# Patient Record
Sex: MALE | Race: WHITE | NOT HISPANIC OR LATINO | Employment: FULL TIME | URBAN - METROPOLITAN AREA
[De-identification: names, ages, dates, MRNs, and addresses within clinical notes are randomized per-mention and may not be internally consistent; named-entity substitution may affect disease eponyms.]

---

## 2020-09-03 ENCOUNTER — COMMUNICATION - HEALTHEAST (OUTPATIENT)
Dept: FAMILY MEDICINE | Facility: CLINIC | Age: 30
End: 2020-09-03

## 2020-09-04 ENCOUNTER — OFFICE VISIT - HEALTHEAST (OUTPATIENT)
Dept: FAMILY MEDICINE | Facility: CLINIC | Age: 30
End: 2020-09-04

## 2020-09-04 DIAGNOSIS — F32.0 MILD MAJOR DEPRESSION (H): ICD-10-CM

## 2020-09-04 ASSESSMENT — ANXIETY QUESTIONNAIRES
IF YOU CHECKED OFF ANY PROBLEMS ON THIS QUESTIONNAIRE, HOW DIFFICULT HAVE THESE PROBLEMS MADE IT FOR YOU TO DO YOUR WORK, TAKE CARE OF THINGS AT HOME, OR GET ALONG WITH OTHER PEOPLE: SOMEWHAT DIFFICULT
3. WORRYING TOO MUCH ABOUT DIFFERENT THINGS: SEVERAL DAYS
4. TROUBLE RELAXING: MORE THAN HALF THE DAYS
7. FEELING AFRAID AS IF SOMETHING AWFUL MIGHT HAPPEN: SEVERAL DAYS
6. BECOMING EASILY ANNOYED OR IRRITABLE: SEVERAL DAYS
2. NOT BEING ABLE TO STOP OR CONTROL WORRYING: SEVERAL DAYS
1. FEELING NERVOUS, ANXIOUS, OR ON EDGE: NOT AT ALL
5. BEING SO RESTLESS THAT IT IS HARD TO SIT STILL: NOT AT ALL
GAD7 TOTAL SCORE: 6

## 2020-09-04 ASSESSMENT — PATIENT HEALTH QUESTIONNAIRE - PHQ9: SUM OF ALL RESPONSES TO PHQ QUESTIONS 1-9: 12

## 2020-09-26 ENCOUNTER — COMMUNICATION - HEALTHEAST (OUTPATIENT)
Dept: FAMILY MEDICINE | Facility: CLINIC | Age: 30
End: 2020-09-26

## 2020-09-26 DIAGNOSIS — F32.0 MILD MAJOR DEPRESSION (H): ICD-10-CM

## 2021-03-30 ENCOUNTER — COMMUNICATION - HEALTHEAST (OUTPATIENT)
Dept: FAMILY MEDICINE | Facility: CLINIC | Age: 31
End: 2021-03-30

## 2021-03-30 DIAGNOSIS — F32.0 MILD MAJOR DEPRESSION (H): ICD-10-CM

## 2021-05-06 ENCOUNTER — APPOINTMENT (OUTPATIENT)
Dept: CT IMAGING | Facility: CLINIC | Age: 31
End: 2021-05-06
Attending: EMERGENCY MEDICINE
Payer: COMMERCIAL

## 2021-05-06 ENCOUNTER — NURSE TRIAGE (OUTPATIENT)
Dept: NURSING | Facility: CLINIC | Age: 31
End: 2021-05-06

## 2021-05-06 ENCOUNTER — COMMUNICATION - HEALTHEAST (OUTPATIENT)
Dept: SCHEDULING | Facility: CLINIC | Age: 31
End: 2021-05-06

## 2021-05-06 ENCOUNTER — RECORDS - HEALTHEAST (OUTPATIENT)
Dept: ADMINISTRATIVE | Facility: OTHER | Age: 31
End: 2021-05-06

## 2021-05-06 ENCOUNTER — HOSPITAL ENCOUNTER (OUTPATIENT)
Facility: CLINIC | Age: 31
Setting detail: OBSERVATION
Discharge: HOME | End: 2021-05-07
Attending: EMERGENCY MEDICINE | Admitting: SURGERY
Payer: COMMERCIAL

## 2021-05-06 DIAGNOSIS — K35.30 ACUTE APPENDICITIS WITH LOCALIZED PERITONITIS, WITHOUT PERFORATION, ABSCESS, OR GANGRENE: ICD-10-CM

## 2021-05-06 LAB
ALBUMIN SERPL-MCNC: 3.9 G/DL (ref 3.4–5)
ALBUMIN UR-MCNC: NEGATIVE MG/DL
ALP SERPL-CCNC: 83 U/L (ref 40–150)
ALT SERPL W P-5'-P-CCNC: 39 U/L (ref 0–70)
ANION GAP SERPL CALCULATED.3IONS-SCNC: 5 MMOL/L (ref 3–14)
APPEARANCE UR: CLEAR
AST SERPL W P-5'-P-CCNC: 20 U/L (ref 0–45)
BASOPHILS # BLD AUTO: 0.1 10E9/L (ref 0–0.2)
BASOPHILS NFR BLD AUTO: 0.4 %
BILIRUB SERPL-MCNC: 0.6 MG/DL (ref 0.2–1.3)
BILIRUB UR QL STRIP: NEGATIVE
BUN SERPL-MCNC: 14 MG/DL (ref 7–30)
CALCIUM SERPL-MCNC: 9.4 MG/DL (ref 8.5–10.1)
CHLORIDE SERPL-SCNC: 105 MMOL/L (ref 94–109)
CO2 SERPL-SCNC: 27 MMOL/L (ref 20–32)
COLOR UR AUTO: ABNORMAL
CREAT SERPL-MCNC: 0.84 MG/DL (ref 0.66–1.25)
DIFFERENTIAL METHOD BLD: ABNORMAL
EOSINOPHIL # BLD AUTO: 0 10E9/L (ref 0–0.7)
EOSINOPHIL NFR BLD AUTO: 0 %
ERYTHROCYTE [DISTWIDTH] IN BLOOD BY AUTOMATED COUNT: 12.1 % (ref 10–15)
GFR SERPL CREATININE-BSD FRML MDRD: >90 ML/MIN/{1.73_M2}
GLUCOSE SERPL-MCNC: 100 MG/DL (ref 70–99)
GLUCOSE UR STRIP-MCNC: NEGATIVE MG/DL
HCT VFR BLD AUTO: 48.2 % (ref 40–53)
HGB BLD-MCNC: 16.8 G/DL (ref 13.3–17.7)
HGB UR QL STRIP: NEGATIVE
IMM GRANULOCYTES # BLD: 0.2 10E9/L (ref 0–0.4)
IMM GRANULOCYTES NFR BLD: 0.7 %
KETONES UR STRIP-MCNC: 10 MG/DL
LABORATORY COMMENT REPORT: NORMAL
LEUKOCYTE ESTERASE UR QL STRIP: NEGATIVE
LIPASE SERPL-CCNC: 63 U/L (ref 73–393)
LYMPHOCYTES # BLD AUTO: 1.6 10E9/L (ref 0.8–5.3)
LYMPHOCYTES NFR BLD AUTO: 7.7 %
MCH RBC QN AUTO: 31.1 PG (ref 26.5–33)
MCHC RBC AUTO-ENTMCNC: 34.9 G/DL (ref 31.5–36.5)
MCV RBC AUTO: 89 FL (ref 78–100)
MONOCYTES # BLD AUTO: 1.1 10E9/L (ref 0–1.3)
MONOCYTES NFR BLD AUTO: 5.2 %
NEUTROPHILS # BLD AUTO: 17.7 10E9/L (ref 1.6–8.3)
NEUTROPHILS NFR BLD AUTO: 86 %
NITRATE UR QL: NEGATIVE
NRBC # BLD AUTO: 0 10*3/UL
NRBC BLD AUTO-RTO: 0 /100
PH UR STRIP: 5.5 PH (ref 5–7)
PLATELET # BLD AUTO: 338 10E9/L (ref 150–450)
POTASSIUM SERPL-SCNC: 3.6 MMOL/L (ref 3.4–5.3)
PROT SERPL-MCNC: 7.7 G/DL (ref 6.8–8.8)
RBC # BLD AUTO: 5.4 10E12/L (ref 4.4–5.9)
RBC #/AREA URNS AUTO: 0 /HPF (ref 0–2)
SARS-COV-2 RNA RESP QL NAA+PROBE: NEGATIVE
SODIUM SERPL-SCNC: 137 MMOL/L (ref 133–144)
SOURCE: ABNORMAL
SP GR UR STRIP: 1.02 (ref 1–1.03)
SPECIMEN SOURCE: NORMAL
SQUAMOUS #/AREA URNS AUTO: 0 /HPF (ref 0–1)
UROBILINOGEN UR STRIP-MCNC: 0 MG/DL (ref 0–2)
WBC # BLD AUTO: 20.6 10E9/L (ref 4–11)
WBC #/AREA URNS AUTO: <1 /HPF (ref 0–5)

## 2021-05-06 PROCEDURE — 258N000003 HC RX IP 258 OP 636: Performed by: EMERGENCY MEDICINE

## 2021-05-06 PROCEDURE — 87635 SARS-COV-2 COVID-19 AMP PRB: CPT | Performed by: EMERGENCY MEDICINE

## 2021-05-06 PROCEDURE — C9803 HOPD COVID-19 SPEC COLLECT: HCPCS

## 2021-05-06 PROCEDURE — 81001 URINALYSIS AUTO W/SCOPE: CPT | Performed by: EMERGENCY MEDICINE

## 2021-05-06 PROCEDURE — 96374 THER/PROPH/DIAG INJ IV PUSH: CPT | Mod: 59

## 2021-05-06 PROCEDURE — 83690 ASSAY OF LIPASE: CPT | Performed by: EMERGENCY MEDICINE

## 2021-05-06 PROCEDURE — 74177 CT ABD & PELVIS W/CONTRAST: CPT

## 2021-05-06 PROCEDURE — 96375 TX/PRO/DX INJ NEW DRUG ADDON: CPT

## 2021-05-06 PROCEDURE — 96361 HYDRATE IV INFUSION ADD-ON: CPT

## 2021-05-06 PROCEDURE — 250N000011 HC RX IP 250 OP 636: Performed by: EMERGENCY MEDICINE

## 2021-05-06 PROCEDURE — 85025 COMPLETE CBC W/AUTO DIFF WBC: CPT | Performed by: EMERGENCY MEDICINE

## 2021-05-06 PROCEDURE — 99285 EMERGENCY DEPT VISIT HI MDM: CPT | Mod: 25

## 2021-05-06 PROCEDURE — 80053 COMPREHEN METABOLIC PANEL: CPT | Performed by: EMERGENCY MEDICINE

## 2021-05-06 PROCEDURE — 250N000011 HC RX IP 250 OP 636: Performed by: SURGERY

## 2021-05-06 PROCEDURE — 250N000009 HC RX 250: Performed by: EMERGENCY MEDICINE

## 2021-05-06 PROCEDURE — G0378 HOSPITAL OBSERVATION PER HR: HCPCS

## 2021-05-06 RX ORDER — ACETAMINOPHEN 650 MG/1
650 SUPPOSITORY RECTAL EVERY 4 HOURS PRN
Status: DISCONTINUED | OUTPATIENT
Start: 2021-05-06 | End: 2021-05-07 | Stop reason: HOSPADM

## 2021-05-06 RX ORDER — HYDROMORPHONE HYDROCHLORIDE 1 MG/ML
0.3 INJECTION, SOLUTION INTRAMUSCULAR; INTRAVENOUS; SUBCUTANEOUS
Status: DISCONTINUED | OUTPATIENT
Start: 2021-05-06 | End: 2021-05-07 | Stop reason: HOSPADM

## 2021-05-06 RX ORDER — PROCHLORPERAZINE 25 MG
25 SUPPOSITORY, RECTAL RECTAL EVERY 12 HOURS PRN
Status: DISCONTINUED | OUTPATIENT
Start: 2021-05-06 | End: 2021-05-07 | Stop reason: HOSPADM

## 2021-05-06 RX ORDER — IOPAMIDOL 755 MG/ML
85 INJECTION, SOLUTION INTRAVASCULAR ONCE
Status: COMPLETED | OUTPATIENT
Start: 2021-05-06 | End: 2021-05-06

## 2021-05-06 RX ORDER — LIDOCAINE 40 MG/G
CREAM TOPICAL
Status: DISCONTINUED | OUTPATIENT
Start: 2021-05-06 | End: 2021-05-07 | Stop reason: HOSPADM

## 2021-05-06 RX ORDER — SODIUM CHLORIDE 9 MG/ML
INJECTION, SOLUTION INTRAVENOUS CONTINUOUS
Status: DISCONTINUED | OUTPATIENT
Start: 2021-05-06 | End: 2021-05-07 | Stop reason: HOSPADM

## 2021-05-06 RX ORDER — DEXTROSE MONOHYDRATE, SODIUM CHLORIDE, AND POTASSIUM CHLORIDE 50; 1.49; 4.5 G/1000ML; G/1000ML; G/1000ML
INJECTION, SOLUTION INTRAVENOUS CONTINUOUS
Status: DISCONTINUED | OUTPATIENT
Start: 2021-05-06 | End: 2021-05-07 | Stop reason: HOSPADM

## 2021-05-06 RX ORDER — ACETAMINOPHEN 325 MG/1
650 TABLET ORAL EVERY 4 HOURS PRN
Status: DISCONTINUED | OUTPATIENT
Start: 2021-05-06 | End: 2021-05-07 | Stop reason: HOSPADM

## 2021-05-06 RX ORDER — BUPROPION HYDROCHLORIDE 300 MG/1
300 TABLET ORAL DAILY
COMMUNITY
Start: 2021-05-02 | End: 2022-12-23

## 2021-05-06 RX ORDER — ONDANSETRON 2 MG/ML
4 INJECTION INTRAMUSCULAR; INTRAVENOUS EVERY 30 MIN PRN
Status: DISCONTINUED | OUTPATIENT
Start: 2021-05-06 | End: 2021-05-07 | Stop reason: HOSPADM

## 2021-05-06 RX ORDER — ONDANSETRON 2 MG/ML
4 INJECTION INTRAMUSCULAR; INTRAVENOUS EVERY 6 HOURS PRN
Status: DISCONTINUED | OUTPATIENT
Start: 2021-05-06 | End: 2021-05-07 | Stop reason: HOSPADM

## 2021-05-06 RX ORDER — CEFOTETAN DISODIUM 2 G/20ML
2 INJECTION, POWDER, FOR SOLUTION INTRAMUSCULAR; INTRAVENOUS ONCE
Status: COMPLETED | OUTPATIENT
Start: 2021-05-06 | End: 2021-05-06

## 2021-05-06 RX ORDER — KETOROLAC TROMETHAMINE 15 MG/ML
15 INJECTION, SOLUTION INTRAMUSCULAR; INTRAVENOUS ONCE
Status: COMPLETED | OUTPATIENT
Start: 2021-05-06 | End: 2021-05-06

## 2021-05-06 RX ORDER — ONDANSETRON 4 MG/1
4 TABLET, ORALLY DISINTEGRATING ORAL EVERY 6 HOURS PRN
Status: DISCONTINUED | OUTPATIENT
Start: 2021-05-06 | End: 2021-05-07 | Stop reason: HOSPADM

## 2021-05-06 RX ORDER — HYDROMORPHONE HYDROCHLORIDE 1 MG/ML
0.5 INJECTION, SOLUTION INTRAMUSCULAR; INTRAVENOUS; SUBCUTANEOUS
Status: COMPLETED | OUTPATIENT
Start: 2021-05-06 | End: 2021-05-06

## 2021-05-06 RX ORDER — HYDROMORPHONE HYDROCHLORIDE 1 MG/ML
0.5 INJECTION, SOLUTION INTRAMUSCULAR; INTRAVENOUS; SUBCUTANEOUS
Status: COMPLETED | OUTPATIENT
Start: 2021-05-06 | End: 2021-05-07

## 2021-05-06 RX ORDER — PIPERACILLIN SODIUM, TAZOBACTAM SODIUM 3; .375 G/15ML; G/15ML
3.38 INJECTION, POWDER, LYOPHILIZED, FOR SOLUTION INTRAVENOUS ONCE
Status: COMPLETED | OUTPATIENT
Start: 2021-05-06 | End: 2021-05-06

## 2021-05-06 RX ORDER — MULTIVITAMIN,THERAPEUTIC
1 TABLET ORAL DAILY
COMMUNITY

## 2021-05-06 RX ORDER — PROCHLORPERAZINE MALEATE 10 MG
10 TABLET ORAL EVERY 6 HOURS PRN
Status: DISCONTINUED | OUTPATIENT
Start: 2021-05-06 | End: 2021-05-07 | Stop reason: HOSPADM

## 2021-05-06 RX ADMIN — IOPAMIDOL 85 ML: 755 INJECTION, SOLUTION INTRAVENOUS at 21:22

## 2021-05-06 RX ADMIN — HYDROMORPHONE HYDROCHLORIDE 0.5 MG: 1 INJECTION, SOLUTION INTRAMUSCULAR; INTRAVENOUS; SUBCUTANEOUS at 20:31

## 2021-05-06 RX ADMIN — ONDANSETRON 4 MG: 2 INJECTION INTRAMUSCULAR; INTRAVENOUS at 20:31

## 2021-05-06 RX ADMIN — SODIUM CHLORIDE 64 ML: 9 INJECTION, SOLUTION INTRAVENOUS at 21:22

## 2021-05-06 RX ADMIN — KETOROLAC TROMETHAMINE 15 MG: 15 INJECTION, SOLUTION INTRAMUSCULAR; INTRAVENOUS at 22:22

## 2021-05-06 RX ADMIN — SODIUM CHLORIDE 1000 ML: 9 INJECTION, SOLUTION INTRAVENOUS at 20:28

## 2021-05-06 RX ADMIN — CEFOTETAN DISODIUM 2 G: 2 INJECTION, POWDER, FOR SOLUTION INTRAMUSCULAR; INTRAVENOUS at 22:23

## 2021-05-06 RX ADMIN — PIPERACILLIN SODIUM AND TAZOBACTAM SODIUM 3.38 G: 3; .375 INJECTION, POWDER, LYOPHILIZED, FOR SOLUTION INTRAVENOUS at 23:00

## 2021-05-06 ASSESSMENT — MIFFLIN-ST. JEOR: SCORE: 1668.86

## 2021-05-06 NOTE — TELEPHONE ENCOUNTER
"Error  Chichi Almendarez, RN MAN   Triage Nurse Advisor [facility]        Additional Information    Negative: Shock suspected (e.g., cold/pale/clammy skin, too weak to stand, low BP, rapid pulse)    Negative: Difficult to awaken or acting confused (e.g., disoriented, slurred speech)    Negative: Passed out (i.e., lost consciousness, collapsed and was not responding)    Negative: Sounds like a life-threatening emergency to the triager    Negative: [1] SEVERE pain (e.g., excruciating) AND [2] present > 1 hour    Negative: [1] SEVERE pain AND [2] age > 60    Negative: [1] Vomiting AND [2] contains red blood or black (\"coffee ground\") material  (Exception: few red streaks in vomit that only happened once)    Negative: Blood in bowel movements  (Exception: Blood on surface of BM with constipation)    Negative: Black or tarry bowel movements  (Exception: chronic-unchanged  black-grey bowel movements AND is taking iron pills or Pepto-bismol)    Negative: [1] Unable to urinate (or only a few drops) > 4 hours AND [2] bladder feels very full (e.g., palpable bladder or strong urge to urinate)    Negative: [1] Pain in the scrotum or testicle AND [2] present > 1 hour    Negative: Patient sounds very sick or weak to the triager    [1] MILD-MODERATE pain AND [2] constant AND [3] present > 2 hours    Protocols used: ABDOMINAL PAIN - MALE-A-AH      "

## 2021-05-07 ENCOUNTER — RECORDS - HEALTHEAST (OUTPATIENT)
Dept: ADMINISTRATIVE | Facility: OTHER | Age: 31
End: 2021-05-07

## 2021-05-07 ENCOUNTER — ANESTHESIA (OUTPATIENT)
Dept: SURGERY | Facility: CLINIC | Age: 31
End: 2021-05-07
Payer: COMMERCIAL

## 2021-05-07 ENCOUNTER — SURGERY (OUTPATIENT)
Age: 31
End: 2021-05-07
Payer: COMMERCIAL

## 2021-05-07 ENCOUNTER — ANESTHESIA EVENT (OUTPATIENT)
Dept: SURGERY | Facility: CLINIC | Age: 31
End: 2021-05-07
Payer: COMMERCIAL

## 2021-05-07 VITALS
TEMPERATURE: 99.4 F | RESPIRATION RATE: 13 BRPM | DIASTOLIC BLOOD PRESSURE: 92 MMHG | OXYGEN SATURATION: 94 % | SYSTOLIC BLOOD PRESSURE: 143 MMHG | BODY MASS INDEX: 29.51 KG/M2 | WEIGHT: 183.6 LBS | HEART RATE: 96 BPM | HEIGHT: 66 IN

## 2021-05-07 PROCEDURE — 88304 TISSUE EXAM BY PATHOLOGIST: CPT | Mod: 26 | Performed by: PATHOLOGY

## 2021-05-07 PROCEDURE — 88304 TISSUE EXAM BY PATHOLOGIST: CPT | Mod: TC | Performed by: SURGERY

## 2021-05-07 PROCEDURE — 710N000009 HC RECOVERY PHASE 1, LEVEL 1, PER MIN: Performed by: SURGERY

## 2021-05-07 PROCEDURE — 99204 OFFICE O/P NEW MOD 45 MIN: CPT | Mod: 57 | Performed by: SURGERY

## 2021-05-07 PROCEDURE — 44970 LAPAROSCOPY APPENDECTOMY: CPT | Performed by: SURGERY

## 2021-05-07 PROCEDURE — 250N000011 HC RX IP 250 OP 636: Performed by: SURGERY

## 2021-05-07 PROCEDURE — 250N000009 HC RX 250: Performed by: SURGERY

## 2021-05-07 PROCEDURE — 272N000001 HC OR GENERAL SUPPLY STERILE: Performed by: SURGERY

## 2021-05-07 PROCEDURE — 44970 LAPAROSCOPY APPENDECTOMY: CPT | Mod: AS | Performed by: PHYSICIAN ASSISTANT

## 2021-05-07 PROCEDURE — 250N000011 HC RX IP 250 OP 636: Performed by: EMERGENCY MEDICINE

## 2021-05-07 PROCEDURE — 258N000003 HC RX IP 258 OP 636: Performed by: SURGERY

## 2021-05-07 PROCEDURE — 258N000003 HC RX IP 258 OP 636: Performed by: NURSE ANESTHETIST, CERTIFIED REGISTERED

## 2021-05-07 PROCEDURE — 250N000009 HC RX 250: Performed by: NURSE ANESTHETIST, CERTIFIED REGISTERED

## 2021-05-07 PROCEDURE — 999N000141 HC STATISTIC PRE-PROCEDURE NURSING ASSESSMENT: Performed by: SURGERY

## 2021-05-07 PROCEDURE — 360N000076 HC SURGERY LEVEL 3, PER MIN: Performed by: SURGERY

## 2021-05-07 PROCEDURE — 710N000012 HC RECOVERY PHASE 2, PER MINUTE: Performed by: SURGERY

## 2021-05-07 PROCEDURE — 370N000017 HC ANESTHESIA TECHNICAL FEE, PER MIN: Performed by: SURGERY

## 2021-05-07 PROCEDURE — 250N000011 HC RX IP 250 OP 636: Performed by: NURSE ANESTHETIST, CERTIFIED REGISTERED

## 2021-05-07 PROCEDURE — G0378 HOSPITAL OBSERVATION PER HR: HCPCS

## 2021-05-07 PROCEDURE — 96376 TX/PRO/DX INJ SAME DRUG ADON: CPT | Mod: 59

## 2021-05-07 PROCEDURE — 250N000025 HC SEVOFLURANE, PER MIN: Performed by: SURGERY

## 2021-05-07 RX ORDER — DEXAMETHASONE SODIUM PHOSPHATE 4 MG/ML
INJECTION, SOLUTION INTRA-ARTICULAR; INTRALESIONAL; INTRAMUSCULAR; INTRAVENOUS; SOFT TISSUE PRN
Status: DISCONTINUED | OUTPATIENT
Start: 2021-05-07 | End: 2021-05-07

## 2021-05-07 RX ORDER — CEFOTETAN DISODIUM 2 G/20ML
2 INJECTION, POWDER, FOR SOLUTION INTRAMUSCULAR; INTRAVENOUS
Status: COMPLETED | OUTPATIENT
Start: 2021-05-07 | End: 2021-05-07

## 2021-05-07 RX ORDER — NALOXONE HYDROCHLORIDE 0.4 MG/ML
0.2 INJECTION, SOLUTION INTRAMUSCULAR; INTRAVENOUS; SUBCUTANEOUS
Status: DISCONTINUED | OUTPATIENT
Start: 2021-05-07 | End: 2021-05-07 | Stop reason: HOSPADM

## 2021-05-07 RX ORDER — ONDANSETRON 2 MG/ML
4 INJECTION INTRAMUSCULAR; INTRAVENOUS EVERY 30 MIN PRN
Status: DISCONTINUED | OUTPATIENT
Start: 2021-05-07 | End: 2021-05-07 | Stop reason: HOSPADM

## 2021-05-07 RX ORDER — FENTANYL CITRATE 50 UG/ML
25-50 INJECTION, SOLUTION INTRAMUSCULAR; INTRAVENOUS
Status: DISCONTINUED | OUTPATIENT
Start: 2021-05-07 | End: 2021-05-07 | Stop reason: HOSPADM

## 2021-05-07 RX ORDER — NALOXONE HYDROCHLORIDE 0.4 MG/ML
0.4 INJECTION, SOLUTION INTRAMUSCULAR; INTRAVENOUS; SUBCUTANEOUS
Status: DISCONTINUED | OUTPATIENT
Start: 2021-05-07 | End: 2021-05-07 | Stop reason: HOSPADM

## 2021-05-07 RX ORDER — MAGNESIUM HYDROXIDE 1200 MG/15ML
LIQUID ORAL PRN
Status: DISCONTINUED | OUTPATIENT
Start: 2021-05-07 | End: 2021-05-07 | Stop reason: HOSPADM

## 2021-05-07 RX ORDER — ONDANSETRON 2 MG/ML
INJECTION INTRAMUSCULAR; INTRAVENOUS PRN
Status: DISCONTINUED | OUTPATIENT
Start: 2021-05-07 | End: 2021-05-07

## 2021-05-07 RX ORDER — OXYCODONE HYDROCHLORIDE 5 MG/1
5-10 TABLET ORAL EVERY 4 HOURS PRN
Qty: 12 TABLET | Refills: 0 | Status: SHIPPED | OUTPATIENT
Start: 2021-05-07 | End: 2022-12-23

## 2021-05-07 RX ORDER — FENTANYL CITRATE 50 UG/ML
INJECTION, SOLUTION INTRAMUSCULAR; INTRAVENOUS PRN
Status: DISCONTINUED | OUTPATIENT
Start: 2021-05-07 | End: 2021-05-07

## 2021-05-07 RX ORDER — ALBUTEROL SULFATE 0.83 MG/ML
2.5 SOLUTION RESPIRATORY (INHALATION) EVERY 4 HOURS PRN
Status: DISCONTINUED | OUTPATIENT
Start: 2021-05-07 | End: 2021-05-07 | Stop reason: HOSPADM

## 2021-05-07 RX ORDER — BUPIVACAINE HYDROCHLORIDE AND EPINEPHRINE 5; 5 MG/ML; UG/ML
INJECTION, SOLUTION PERINEURAL PRN
Status: DISCONTINUED | OUTPATIENT
Start: 2021-05-07 | End: 2021-05-07 | Stop reason: HOSPADM

## 2021-05-07 RX ORDER — SODIUM CHLORIDE, SODIUM LACTATE, POTASSIUM CHLORIDE, CALCIUM CHLORIDE 600; 310; 30; 20 MG/100ML; MG/100ML; MG/100ML; MG/100ML
INJECTION, SOLUTION INTRAVENOUS CONTINUOUS
Status: DISCONTINUED | OUTPATIENT
Start: 2021-05-07 | End: 2021-05-07 | Stop reason: HOSPADM

## 2021-05-07 RX ORDER — LIDOCAINE HYDROCHLORIDE 20 MG/ML
INJECTION, SOLUTION INFILTRATION; PERINEURAL PRN
Status: DISCONTINUED | OUTPATIENT
Start: 2021-05-07 | End: 2021-05-07

## 2021-05-07 RX ORDER — MEPERIDINE HYDROCHLORIDE 25 MG/ML
12.5 INJECTION INTRAMUSCULAR; INTRAVENOUS; SUBCUTANEOUS EVERY 5 MIN PRN
Status: DISCONTINUED | OUTPATIENT
Start: 2021-05-07 | End: 2021-05-07 | Stop reason: HOSPADM

## 2021-05-07 RX ORDER — HYDRALAZINE HYDROCHLORIDE 20 MG/ML
2.5-5 INJECTION INTRAMUSCULAR; INTRAVENOUS EVERY 10 MIN PRN
Status: DISCONTINUED | OUTPATIENT
Start: 2021-05-07 | End: 2021-05-07 | Stop reason: HOSPADM

## 2021-05-07 RX ORDER — AMOXICILLIN 250 MG
1-2 CAPSULE ORAL 2 TIMES DAILY PRN
Qty: 20 TABLET | Refills: 0 | Status: SHIPPED | OUTPATIENT
Start: 2021-05-07 | End: 2022-12-23

## 2021-05-07 RX ORDER — LABETALOL HYDROCHLORIDE 5 MG/ML
10 INJECTION, SOLUTION INTRAVENOUS
Status: DISCONTINUED | OUTPATIENT
Start: 2021-05-07 | End: 2021-05-07 | Stop reason: HOSPADM

## 2021-05-07 RX ORDER — SODIUM CHLORIDE, SODIUM LACTATE, POTASSIUM CHLORIDE, CALCIUM CHLORIDE 600; 310; 30; 20 MG/100ML; MG/100ML; MG/100ML; MG/100ML
INJECTION, SOLUTION INTRAVENOUS CONTINUOUS
Status: CANCELLED | OUTPATIENT
Start: 2021-05-07

## 2021-05-07 RX ORDER — ONDANSETRON 4 MG/1
4 TABLET, ORALLY DISINTEGRATING ORAL EVERY 30 MIN PRN
Status: DISCONTINUED | OUTPATIENT
Start: 2021-05-07 | End: 2021-05-07 | Stop reason: HOSPADM

## 2021-05-07 RX ORDER — OXYCODONE HYDROCHLORIDE 5 MG/1
5 TABLET ORAL
Status: DISCONTINUED | OUTPATIENT
Start: 2021-05-07 | End: 2021-05-07 | Stop reason: HOSPADM

## 2021-05-07 RX ORDER — KETOROLAC TROMETHAMINE 30 MG/ML
INJECTION, SOLUTION INTRAMUSCULAR; INTRAVENOUS PRN
Status: DISCONTINUED | OUTPATIENT
Start: 2021-05-07 | End: 2021-05-07

## 2021-05-07 RX ORDER — PROPOFOL 10 MG/ML
INJECTION, EMULSION INTRAVENOUS PRN
Status: DISCONTINUED | OUTPATIENT
Start: 2021-05-07 | End: 2021-05-07

## 2021-05-07 RX ORDER — SODIUM CHLORIDE, SODIUM LACTATE, POTASSIUM CHLORIDE, CALCIUM CHLORIDE 600; 310; 30; 20 MG/100ML; MG/100ML; MG/100ML; MG/100ML
INJECTION, SOLUTION INTRAVENOUS CONTINUOUS PRN
Status: DISCONTINUED | OUTPATIENT
Start: 2021-05-07 | End: 2021-05-07

## 2021-05-07 RX ORDER — HYDROMORPHONE HYDROCHLORIDE 1 MG/ML
.3-.5 INJECTION, SOLUTION INTRAMUSCULAR; INTRAVENOUS; SUBCUTANEOUS EVERY 5 MIN PRN
Status: DISCONTINUED | OUTPATIENT
Start: 2021-05-07 | End: 2021-05-07 | Stop reason: HOSPADM

## 2021-05-07 RX ADMIN — HYDROMORPHONE HYDROCHLORIDE 0.3 MG: 1 INJECTION, SOLUTION INTRAMUSCULAR; INTRAVENOUS; SUBCUTANEOUS at 02:09

## 2021-05-07 RX ADMIN — MIDAZOLAM 1 MG: 1 INJECTION INTRAMUSCULAR; INTRAVENOUS at 09:43

## 2021-05-07 RX ADMIN — KETOROLAC TROMETHAMINE 30 MG: 30 INJECTION, SOLUTION INTRAMUSCULAR at 10:22

## 2021-05-07 RX ADMIN — POTASSIUM CHLORIDE, DEXTROSE MONOHYDRATE AND SODIUM CHLORIDE: 150; 5; 450 INJECTION, SOLUTION INTRAVENOUS at 00:04

## 2021-05-07 RX ADMIN — ONDANSETRON 4 MG: 2 INJECTION INTRAMUSCULAR; INTRAVENOUS at 10:22

## 2021-05-07 RX ADMIN — LIDOCAINE HYDROCHLORIDE 15 ML: 10; .005 INJECTION, SOLUTION EPIDURAL; INFILTRATION; INTRACAUDAL; PERINEURAL at 10:04

## 2021-05-07 RX ADMIN — ROCURONIUM BROMIDE 50 MG: 10 INJECTION INTRAVENOUS at 09:48

## 2021-05-07 RX ADMIN — FENTANYL CITRATE 100 MCG: 50 INJECTION, SOLUTION INTRAMUSCULAR; INTRAVENOUS at 09:48

## 2021-05-07 RX ADMIN — PROPOFOL 200 MG: 10 INJECTION, EMULSION INTRAVENOUS at 09:48

## 2021-05-07 RX ADMIN — LIDOCAINE HYDROCHLORIDE 100 MG: 20 INJECTION, SOLUTION INFILTRATION; PERINEURAL at 09:48

## 2021-05-07 RX ADMIN — DEXAMETHASONE SODIUM PHOSPHATE 4 MG: 4 INJECTION, SOLUTION INTRA-ARTICULAR; INTRALESIONAL; INTRAMUSCULAR; INTRAVENOUS; SOFT TISSUE at 09:56

## 2021-05-07 RX ADMIN — HYDROMORPHONE HYDROCHLORIDE 0.3 MG: 1 INJECTION, SOLUTION INTRAMUSCULAR; INTRAVENOUS; SUBCUTANEOUS at 08:02

## 2021-05-07 RX ADMIN — HYDROMORPHONE HYDROCHLORIDE 0.5 MG: 1 INJECTION, SOLUTION INTRAMUSCULAR; INTRAVENOUS; SUBCUTANEOUS at 04:58

## 2021-05-07 RX ADMIN — SODIUM CHLORIDE 1000 ML: 900 IRRIGANT IRRIGATION at 10:04

## 2021-05-07 RX ADMIN — SODIUM CHLORIDE, POTASSIUM CHLORIDE, SODIUM LACTATE AND CALCIUM CHLORIDE: 600; 310; 30; 20 INJECTION, SOLUTION INTRAVENOUS at 09:30

## 2021-05-07 RX ADMIN — CEFOTETAN DISODIUM 2 G: 2 INJECTION, POWDER, FOR SOLUTION INTRAMUSCULAR; INTRAVENOUS at 09:53

## 2021-05-07 RX ADMIN — BUPIVACAINE HYDROCHLORIDE AND EPINEPHRINE BITARTRATE 15 ML: 5; .005 INJECTION, SOLUTION PERINEURAL at 10:04

## 2021-05-07 ASSESSMENT — MIFFLIN-ST. JEOR: SCORE: 1730.55

## 2021-05-07 ASSESSMENT — LIFESTYLE VARIABLES: TOBACCO_USE: 1

## 2021-05-07 NOTE — ANESTHESIA POSTPROCEDURE EVALUATION
Patient: Mulugeta Valentin    Procedure(s):  APPENDECTOMY, LAPAROSCOPIC    Diagnosis:Acute appendicitis [K35.80]  Diagnosis Additional Information: No value filed.    Anesthesia Type:  General    Note:  Disposition: Inpatient   Postop Pain Control: Uneventful            Sign Out: Well controlled pain   PONV: No   Neuro/Psych: Uneventful            Sign Out: Acceptable/Baseline neuro status   Airway/Respiratory: Uneventful            Sign Out: Acceptable/Baseline resp. status   CV/Hemodynamics: Uneventful            Sign Out: Acceptable CV status; No obvious hypovolemia; No obvious fluid overload   Other NRE: NONE   DID A NON-ROUTINE EVENT OCCUR? No           Last vitals:  Vitals:    05/07/21 1050 05/07/21 1100 05/07/21 1110   BP: (!) 141/87 (!) 140/97 (!) 143/92   Pulse: 111 104 96   Resp: 22 18 13   Temp:      SpO2: 97% 94% 94%       Last vitals prior to Anesthesia Care Transfer:  CRNA VITALS  5/7/2021 1004 - 5/7/2021 1104      5/7/2021             Resp Rate (set):  10          Electronically Signed By: Mckenzie Hobbs MD  May 7, 2021  12:06 PM

## 2021-05-07 NOTE — BRIEF OP NOTE
[facility]    Brief Operative Note    Pre-operative diagnosis: Acute appendicitis [K35.80]  Post-operative diagnosis Same as pre-operative diagnosis    Procedure: Procedure(s):  APPENDECTOMY, LAPAROSCOPIC  Surgeon: Surgeon(s) and Role:     * Joseph Beck MD - Primary     * Kranthi Wilde PA-C - Assisting  Anesthesia: General   Estimated blood loss: 10cc  Drains: None  Specimens:   ID Type Source Tests Collected by Time Destination   A :  Tissue Appendix SURGICAL PATHOLOGY EXAM Joseph Beck MD 5/7/2021 10:16 AM      Findings:   Suppurative acute appendicitis. No gross perforation but notable fibrinous exudate.  Complications: None.  Implants: * No implants in log *    Kranthi Wilde PA-C  Office: [phone]  Pager: [phone]

## 2021-05-07 NOTE — ANESTHESIA CARE TRANSFER NOTE
Patient: Mulugeta Valentin    Procedure(s):  APPENDECTOMY, LAPAROSCOPIC    Diagnosis: Acute appendicitis [K35.80]  Diagnosis Additional Information: No value filed.    Anesthesia Type:   General     Note:    Oropharynx: oropharynx clear of all foreign objects and spontaneously breathing  Level of Consciousness: awake  Oxygen Supplementation: face mask  Level of Supplemental Oxygen (L/min / FiO2): 6  Independent Airway: airway patency satisfactory and stable  Dentition: dentition unchanged  Vital Signs Stable: post-procedure vital signs reviewed and stable  Report to RN Given: handoff report given  Patient transferred to: PACU    Handoff Report: Identifed the Patient, Identified the Reponsible Provider, Reviewed the pertinent medical history, Discussed the surgical course, Reviewed Intra-OP anesthesia mangement and issues during anesthesia, Set expectations for post-procedure period and Allowed opportunity for questions and acknowledgement of understanding      Vitals: (Last set prior to Anesthesia Care Transfer)  CRNA VITALS  5/7/2021 1004 - 5/7/2021 1041      5/7/2021             Resp Rate (set):  10        Electronically Signed By: WILIAM Gee CRNA  May 7, 2021  10:41 AM

## 2021-05-07 NOTE — PLAN OF CARE
@0001 Received the patient from ED. Covid negative. Pt is diagnosed with acute appendicitis. A&O X4. VSS on RA. Up independent. NPO. IVF dextrose 5% and 0.45%NaCl + KCL 20 mEq/L @100 ml/hr. PRN DILAUDID 0.3 mg @0209 and 0.5 mg @0458 given for abdominal pain . Continue to monitor.     PRIMARY DIAGNOSIS: ACUTE PAIN  OUTPATIENT/OBSERVATION GOALS TO BE MET BEFORE DISCHARGE:  1. Pain Status: pain reduced     2. Return to near baseline physical activity: Met     3. Cleared for discharge by consultants (if involved):Not met     Discharge Planner Nurse   Safe discharge environment identified: Not met

## 2021-05-07 NOTE — ED PROVIDER NOTES
"History      Chief Complaint:  Abdominal Pain       The history is provided by the patient.     Mulugeta Valentin is a 30 year old male who presents for evaluation of abdominal pain. The patient first began having pain earlier today which has since moved to the right side of his abdomen. This has worsened throughout the day and is a sharp pain which is exacerbated with deep inspiration. He also has chills and had one episode of emesis earlier today.     Here, he denies cough, chest pain, shortness of breath, testicular pain, penile pain, dysuria, hematuria, diarrhea, or other symptoms. He actually feels more constipated today but did have a bowel movement this morning. He has not had previously abdominal surgeries and has never had previous similar pain. He last ate at 1200 and last drank water at 1600.       Review of Systems   All other systems reviewed and are negative.      Allergies:  Sulfa drugs      Medications:   Bupropion    Medical History:   ADHD    Surgical History:  Eye surgery     Family History:   Father -  COPD, cancer, hyperlipidemia, hypertension  Mother -  Depression   Brother(s) -  Anxiety, depression     Social History:  The patient was unaccompanied to the ED.  Smoking Status: No  Alcohol Use: No  Drug Use: No   PCP: Moises Orozco   He is a pharmacy technician.        Physical Exam     Patient Vitals for the past 24 hrs:   BP Temp Temp src Pulse Resp SpO2 Height Weight   05/06/21 1929 (!) 166/104 98.1  F (36.7  C) Oral 104 20 99 % 1.676 m (5' 6\") 77.1 kg (170 lb)        Physical Exam  General: Resting on the bed.  Head: No obvious trauma to head.  Ears, Nose, Throat:  External ears normal.  Nose normal.  Eyes:  Conjunctivae clear.  Pupils are equal, round, and reactive.   Neck: Normal range of motion.  Neck supple.   CV: Regular rate and rhythm.  No murmurs.      Respiratory: Effort normal and breath sounds normal.  No wheezing or crackles.   Gastrointestinal: Soft.  No distension. There is " RLQ and right flank tenderness.  There is no rigidity, no rebound and no guarding.   Musculoskeletal: rigth cva tenderness   Neuro: Alert. Moving all extremities appropriately.  Normal speech.    Skin: Skin is warm and dry.  No rash noted.     Emergency Department Course     Imaging:    CT Abdomen Pelvis w Contrast:   IMPRESSION:   1.  Acute appendicitis. Retrocecal appendix near the inferior right   hepatic lobe. No associated abscess or free intraperitoneal air.  Reading per radiology.     Laboratory:    CBC: WBC 20.6 (H), HGB 16.8,   CMP: Glucose 100 (H), o/w WNL (Creatinine 0.84)   Lipase: 63 (L)      UA with Microscopic: Ketones 10 (A), o/w WNL     Asymptomatic COVID-19 (Coronavirus) PCR by Nasopharyngeal Swab: Pending     Emergency Department Course:    Reviewed:  I reviewed the patient's nursing notes, vitals, past medical records, Care Everywhere.     Assessments:  1949 I obtained history and performed an exam of the patient, as documented above.   2156 I rechecked the patient and updated them on imaging and laboratory results. We discussed the plan for operation and admission in the meantime. They are in agreement with this plan.      Consults:   2152 I spoke with Dr. Reyes of the general surgery service regarding patient's presentation, findings, and plan of care. Will admit him to observation for definitive intervention in the OR later.     Interventions:  2028 Normal Saline 1000 mL IV   2031 Zofran 4 mg IV   2031 Dilaudid 0.5 mg IV   2222 Toradol 15 mg IV  2223 Cefotetan 2 g IV    Disposition:  The patient was admitted to observation pending surgery with Dr. Reyes. They accept the patient to observation in the meantime.     Impression & Plan       Medical Decision Making:  Mulugeta Valentin is a 30 year old male who presents with abdominal pain concerning for appendicitis.  CBC shows white count of 20.6 but no evidence of anemia.  BMP shows no acute electrolyte, metabolic or renal dysfunction.   LFTs and lipase are reassuring.  UA unrevealing.  CT scan confirms the presence of appendicitis, and there is no evidence of rupture or abscess at this time. The patient s symptoms have been controlled with interventions in the Emergency Department, and he appears more comfortable on recheck. I discussed the diagnosis with the patient and have answered all questions about the plan of care. He was treated with cefotetan in the ED. I discussed the patient with the on call general surgeon, Dr. Reyes, and the patient will be admitted for surgery likely in the morning. He has remained hemodynamically stable in the Emergency Department.    Covid-19  Mulugeta Valentin was evaluated during a global COVID-19 pandemic, which necessitated consideration that the patient might be at risk for infection with the SARS-CoV-2 virus that causes COVID-19.   Applicable protocols for evaluation were followed during the patient's care.   COVID-19 was considered as part of the patient's evaluation. The plan for testing is:  a test was obtained during this visit.    Diagnosis:     ICD-10-CM    1. Acute appendicitis with localized peritonitis, without perforation, abscess, or gangrene  K35.30         Scribe Disclosure:  I, Gaby Montilla, am serving as a scribe at 7:49 PM on 5/6/2021 to document services personally performed by Ayana Garcia MD based on my observations and the provider's statements to me.      Ayana Garcia MD  05/06/21 5780

## 2021-05-07 NOTE — DISCHARGE INSTRUCTIONS
Today you received Toradol, an antiinflammatory medication similar to Ibuprofen.  You should not take other antiinflammatory medication, such as Ibuprofen, Motrin, Advil, Aleve, Naprosyn, etc until ***.         [facility]   Discharge Instructions: Post-Operative Laparoscopic Appendectomy     ACTIVITY     Expect to feel tired after your surgery. This will gradually resolve.     Take frequent, short walks and increase your activity gradually.     Avoid strenuous physical activity or heavy lifting greater than 15-20 lbs. for 2-3 weeks. You may climb stairs.     You may drive without restrictions when you are not using any prescription pain medication and feel comfortable in a car.     You may return to work/school when you are comfortable without any prescription pain medication.     WOUND CARE     You may remove your outer dressing or Band-Aids and shower 48 hours after the surgery. Pat your incisions dry and leave them open to air. Re-apply dressing (Band-Aids or gauze/tape) as needed for comfort or drainage.     You may have steri-strips (looks like white tape) on your incision. You may peel off the steri-strips 2 weeks after your surgery if they have not peeled off on their own.     Do not soak your incisions in a tub or pool for 2 weeks.     Do not apply any lotions, creams, or ointments to your incisions.     A ridge under your incisions is normal and will gradually resolve.     DIET     Start with liquids, then gradually resume your regular diet as tolerated. Avoid heavy, spicy, and greasy meals for 2-3 days.     Drink plenty of fluids to stay hydrated.     PAIN     Expect some tenderness and discomfort at the incision sites. Use the prescribed pain medication at your discretion. Expect gradual resolution of your pain over several days.     You may take ibuprofen with food (unless you have been told not to) or ***acetaminophen/Tylenol instead of or in addition to your prescribed  pain medication. If you are taking Norco or Percocet, do not take any additional acetaminophen/Tylenol.     Do not drink alcohol or drive while you are taking pain medications.     You may apply ice to your incisions in 20 minute intervals as needed for the next 48 hours. After that time, consider switching to heat if you prefer.     EXPECTATIONS     Pain medications can cause constipation. Limit use when possible. Take over the counter stool softener/stimulant, such as Colace or Senna, 1-2 times a day with plenty of water. You may take a mild over the counter laxative, such as Miralax or a suppository, as needed. You make discontinue these medications once you are having regular bowel movements and/or are no longer taking your narcotic pain medication.     You may have shoulder or upper back discomfort due to the gas used in surgery. This is temporary and should resolve in 48-72 hours. Short, frequent walks may help with this.     If you are unable to urinate, or feel as though you are not emptying your bladder adequately, we recommend you call our office and/or seek care at an ER or Urgent Care facility if after hours.     FOLLOW UP     Our office will contact you in approximately 2 weeks to check on your progress and answer any questions you may have. If you are doing well, you will not need to return for a follow up appointment.? If any concerns are identified over the phone, we will help you make an appointment to see a provider.     If you have not received a phone call, have any questions or concerns, or would like to be seen, please call us at [phone] and ask to speak with our nurse. We are located at [address] [place] [address].     CALL OUR OFFICE -[phone] IF YOU HAVE:     Chills or fever above 101 F.     Increased redness, warmth, or drainage at your incisions.     Significant bleeding.     Pain not relieved by your pain medication or rest.     Increasing pain after the  first 48 hours.     Any other concerns or questions.     Revised September 2020     Same Day Surgery Discharge Instructions for  Sedation and General Anesthesia       It's not unusual to feel dizzy, light-headed or faint for up to 24 hours after surgery or while taking pain medication.  If you have these symptoms: sit for a few minutes before standing and have someone assist you when you get up to walk or use the bathroom.      You should rest and relax for the next 24 hours. We recommend you make arrangements to have an adult stay with you for at least 24 hours after your discharge.  Avoid hazardous and strenuous activity.      DO NOT DRIVE any vehicle or operate mechanical equipment for 24 hours following the end of your surgery.  Even though you may feel normal, your reactions may be affected by the medication you have received.      Do not drink alcoholic beverages for 24 hours following surgery.       Slowly progress to your regular diet as you feel able. It's not unusual to feel nauseated and/or vomit after receiving anesthesia.  If you develop these symptoms, drink clear liquids (apple juice, ginger ale, broth, 7-up, etc. ) until you feel better.  If your nausea and vomiting persists for 24 hours, please notify your surgeon.        All narcotic pain medications, along with inactivity and anesthesia, can cause constipation. Drinking plenty of liquids and increasing fiber intake will help.      For any questions of a medical nature, call your surgeon.      Do not make important decisions for 24 hours.      If you had general anesthesia, you may have a sore throat for a couple of days related to the breathing tube used during surgery.  You may use Cepacol lozenges to help with this discomfort.  If it worsens or if you develop a fever, contact your surgeon.       If you feel your pain is not well managed with the pain medications prescribed by your surgeon, please contact your surgeon's office to let them know so  they can address your concerns.       CoVid 19 Information    We want to give you information regarding Covid. Please consult your primary care provider with any questions you might have.     Patient who have symptoms (cough, fever, or shortness of breath), need to isolate for 7 days from when symptoms started OR 72 hours after fever resolves (without fever reducing medications) AND improvement of respiratory symptoms (whichever is longer).      Isolate yourself at home (in own room/own bathroom if possible)    Do Not allow any visitors    Do Not go to work or school    Do Not go to [employer],  centers, shopping, or other public places.    Do Not shake hands.    Avoid close and intimate contact with others (hugging, kissing).    Follow CDC recommendations for household cleaning of frequently touched services.     After the initial 7 days, continue to isolate yourself from household members as much as possible. To continue decrease the risk of community spread and exposure, you and any members of your household should limit activities in public for 14 days after starting home isolation.     You can reference the following CDC link for helpful home isolation/care tips:  https://www.cdc.gov/coronavirus/2019-ncov/downloads/10Things.pdf    Protect Others:    Cover Your Mouth and Nose with a mask, disposable tissue or wash cloth to avoid spreading germs to others.    Wash your hands and face frequently with soap and water    Call Your Primary Doctor If: Breathing difficulty develops or you become worse.    For more information about COVID19 and options for caring for yourself at home, please visit the CDC website at https://www.cdc.gov/coronavirus/2019-ncov/about/steps-when-sick.html  For more options for care at [facility], please visit our website at [email or web address]

## 2021-05-07 NOTE — ANESTHESIA PROCEDURE NOTES
Airway       Patient location: [facility] - Operating Room or Procedural Area.       Procedure Start/Stop Times: 5/7/2021 9:50 AM  Staff -        Anesthesiologist:  Mckenzie Hobbs MD       CRNA: Emily Cerda APRN CRNA       Performed By: CRNA  Consent for Airway        Urgency: elective  Indications and Patient Condition       Indications for airway management: ej-procedural and airway protection       Induction type:intravenous       Mask difficulty assessment: 1 - vent by mask    Final Airway Details       Final airway type: endotracheal airway       Successful airway: ETT - single  Endotracheal Airway Details        ETT size (mm): 8.0       Cuffed: yes       Cuff volume (mL): 7       Successful intubation technique: direct laryngoscopy       DL Blade Type: MAC 3       Grade View of Cords: 2       Adjucts: stylet       Position: Right       Measured from: lips       Secured at (cm): 23       Bite block used: None    Post intubation assessment        Placement verified by: capnometry, equal breath sounds and chest rise        Number of attempts at approach: 1       Number of other approaches attempted: 0       Secured with: pink tape       Ease of procedure: easy       Dentition: Intact and Unchanged    Medication(s) Administered   Medication Administration Time: 5/7/2021 9:50 AM

## 2021-05-07 NOTE — OP NOTE
PREOPERATIVE DIAGNOSIS: Acute appendicitis.     POSTOPERATIVE DIAGNOSIS: Acute appendicitis.     PROCEDURE: Laparoscopic appendectomy.     SURGEON: Joseph Beck MD     ASSISTANT: Kranthi Wilde PA-C    ANESTHESIA: GET.     COMPLICATIONS: None.     BLOOD LOSS: Minimal.     FINDINGS: Acute appendicitis without perforation.     INDICATIONS: Mr. Valentin presented with appendicitis and is now presenting for laparoscopic appendectomy. I explained the risks, benefits, complications including but not limited to bleeding, infection, possible need to open, possible postop hematoma, seroma, bowel or bladder injury, all which require additional procedures. The patient did agree and did sign consent.   .   DETAILS OF PROCEDURE: The patient was brought to the operating room per anesthesia, placed in supine position, intubated without difficulty. Surgical timeout was then performed, verifying the correct surgeon, site, procedure and patient. All in the room were in agreement. The patient was prepped and draped in the usual fashion using ChloraPrep. 1% and 0.25% Marcaine were injected to the supraumbilical area. Skin incision was made, carried down to the fascia. The anterior fascia was grasped with 2 Kocher's sharply incised. An open Mai technique was used to gain entry into the abdominal cavity. A camera was inserted and revealed no trocar injuries. The abdomen was insufflated with pressure of 15, which the patient tolerated well. Two 5's were placed in the suprapubic and left lower quadrant under direct visualization. The appendix was found in the right upper quadrant in the retrocecal position. It was severely inflamed and partial necrotic but not perforated.  The base of the cecum and terminal ileum were normal. A mesenteric window was created at the base of the cecum.  A URIEL blue load was fired across this area without issues. Once this was done, the appendix was freed of the lateral wall with cautery. The appendiceal  artery was delineated and a URIEL white load was fired across this. No bleeding occurred. The appendix was then placed an EndoCatch bag and removed through the umbilical trocar without difficulty. The area was explored. Staple lines were completely intact. There was no bleeding whatsoever. It was irrigated with saline and suctioned.The pelvis showed no acute findings. All trocars were taken out under direct visualization. The fascia was closed with an 0 Vicryl in figure-of-eight fashion. Marcaine 0.25% injected to all the wounds. They were irrigated and closed with 4-0 Monocryl in subcuticular fashion. Steri strips were applied. The patient tolerated the procedure well and was awoken from anesthesia and transferred to PACU in stable condition. All sponge, instrument, and needle counts were correct at the conclusion of the procedure. A physician assistant was needed for camera operation, retraction, and closure.    YUMIKO KWOK MD     Please CC to PCP

## 2021-05-07 NOTE — H&P
"[facility]  General Surgery Consultation         Joseph Beck MD    Mulugeta Valentin MRN# [id number]   YOB: 1990 Age: 30 year old      Date of Admission:  5/6/2021  Date of Consult: 5/7/2021         Assessment and Plan:   Patient is a 30 year old male with acute appendicitis    PLAN:  I discussed the pathophysiology of appendicitis including medical and surgical management. I have also personally reviewed the imaging studies that show probable acute appendicitis. I would recommend going forward with laparoscopic appendectomy today. I have also discussed the risks, benefits, complications including but not limited to bleeding, infection, possible injury to the bowel or ureter, possible anastomotic dehiscence, possible post operative abscess, possible postoperative MI, PE, or other anesthetic complications. If one of these complications did arise the patient could require further surgery. The patient thoroughly understood the conversation and will sign consent.        Chief Complaint:     Chief Complaint   Patient presents with     Abdominal Pain          History of Present Illness:   Patient is a 30 year old male who I was asked to see by Dr. Lema for evaluation of appendicitis.The patient describes having symptoms for the last 1 weeks. The pain is mainly located in the RUQ/RLQ area. The patient rates the pain a 7 out of 10. The pain is exacerbated by activity. The pain is relieved by rest. The patient does have nausea and vomiting. Had a fever at home. CT shows appendicitis. Patient denies chills, jaundice, changes in stool or urine, headache, SOB, chest pain.          Physical Exam:   Blood pressure 128/78, pulse 80, temperature 98.2  F (36.8  C), temperature source Oral, resp. rate 19, height 1.676 m (5' 6\"), weight 83.3 kg (183 lb 9.6 oz), SpO2 99 %.  183 lbs 9.6 oz  General: Generally appears well.  Psych: Alert and Oriented.  Normal affect  Neurological: grossly " intact  Eyes: Sclera clear  Respiratory:  Lungs with good air excursion  Cardiovascular:  Normal peripheral pulses  GI: Abdomen Soft Moderate tenderness to palpation RUQ RLQ No hernias palpated..  Lymphatic/Hematologic/Immune:  No obvious lymphadenopathy.  Integumentary:  No rashes       Past Medical History:   Past medical history reviewed. No pertinent history applicable.         Past Surgical History:   Past surgical history reviewed. No pertinent surgical history applicable.         Current Medications:           cefoTEtan  2 g Intravenous On Call to OR     sodium chloride (PF)  3 mL Intracatheter Q8H     acetaminophen, acetaminophen, HYDROmorphone, lidocaine 4%, lidocaine (buffered or not buffered), ondansetron, ondansetron **OR** ondansetron, prochlorperazine **OR** prochlorperazine **OR** prochlorperazine, sodium chloride (PF)       Home Medications:     Prior to Admission medications    Medication Sig Last Dose Taking? Auth Provider   buPROPion (WELLBUTRIN XL) 300 MG 24 hr tablet Take 300 mg by mouth daily 5/6/2021 at 1100 Yes Unknown, Entered By History   multivitamin, therapeutic (THERA-VIT) TABS tablet Take 1 tablet by mouth daily 5/6/2021 at am Yes Unknown, Entered By History          Allergies:     Allergies   Allergen Reactions     Sulfa Drugs           Family History:   Family history personally reviewed and revealed no pertinent history.        Social History:   Mulugeta Valentin    1/2PPD       Review of Systems:   The 12 point Review of Systems is negative other than noted in the HPI.       Labs/Imaging   All new lab and imaging data was reviewed.   Joseph Beck M.D.  [facility]

## 2021-05-07 NOTE — PHARMACY-ADMISSION MEDICATION HISTORY
Pharmacy Medication History  Admission medication history interview status for the 5/6/2021  admission is complete. See EPIC admission navigator for prior to admission medications     Location of Interview: Patient room  Medication history sources: Patient and Surescripts    Significant changes made to the medication list:  Added both meds    In the past week, patient estimated taking medication this percent of the time: greater than 90%    Additional medication history information:   none    Medication reconciliation completed by provider prior to medication history? No    Time spent in this activity: 10 minutes    Prior to Admission medications    Medication Sig Last Dose Taking? Auth Provider   buPROPion (WELLBUTRIN XL) 300 MG 24 hr tablet Take 300 mg by mouth daily 5/6/2021 at 1100 Yes Unknown, Entered By History   multivitamin, therapeutic (THERA-VIT) TABS tablet Take 1 tablet by mouth daily 5/6/2021 at am Yes Unknown, Entered By History       The information provided in this note is only as accurate as the sources available at the time of update(s)

## 2021-05-07 NOTE — ANESTHESIA PREPROCEDURE EVALUATION
Anesthesia Pre-Procedure Evaluation    Patient: Mulugeta Valentin   MRN: [id number] : 1990        Preoperative Diagnosis: Acute appendicitis [K35.80]   Procedure : Procedure(s):  APPENDECTOMY, LAPAROSCOPIC     No past medical history on file.   No past surgical history on file.   Allergies   Allergen Reactions     Sulfa Drugs       Social History     Tobacco Use     Smoking status: Not on file   Substance Use Topics     Alcohol use: Not on file      Wt Readings from Last 1 Encounters:   21 83.3 kg (183 lb 9.6 oz)        Anesthesia Evaluation   Pt has not had prior anesthetic         ROS/MED HX  ENT/Pulmonary:     (+) tobacco use, Current use,  (-) sleep apnea   Neurologic:  - neg neurologic ROS     Cardiovascular:  - neg cardiovascular ROS     METS/Exercise Tolerance:     Hematologic:       Musculoskeletal:       GI/Hepatic:     (+) appendicitis,  (-) GERD   Renal/Genitourinary:  - neg Renal ROS     Endo:  - neg endo ROS     Psychiatric/Substance Use:     (+) psychiatric history depression     Infectious Disease:       Malignancy:       Other:            Physical Exam    Airway        Mallampati: II   TM distance: > 3 FB   Neck ROM: full   Mouth opening: > 3 cm    Respiratory Devices and Support         Dental  no notable dental history         Cardiovascular   cardiovascular exam normal          Pulmonary   pulmonary exam normal                OUTSIDE LABS:  CBC:   Lab Results   Component Value Date    WBC 20.6 (H) 2021    HGB 16.8 2021    HCT 48.2 2021     2021     BMP:   Lab Results   Component Value Date     2021    POTASSIUM 3.6 2021    CHLORIDE 105 2021    CO2 27 2021    BUN 14 2021    CR 0.84 2021     (H) 2021     COAGS: No results found for: PTT, INR, FIBR  POC: No results found for: BGM, HCG, HCGS  HEPATIC:   Lab Results   Component Value Date    ALBUMIN 3.9 2021    PROTTOTAL 7.7 2021    ALT 39  05/06/2021    AST 20 05/06/2021    ALKPHOS 83 05/06/2021    BILITOTAL 0.6 05/06/2021     OTHER:   Lab Results   Component Value Date    JASON 9.4 05/06/2021    LIPASE 63 (L) 05/06/2021       Anesthesia Plan    ASA Status:  2   NPO Status:  NPO Appropriate    Anesthesia Type: General.     - Airway: ETT   Induction: Intravenous, Propofol.   Maintenance: Balanced.        Consents    Anesthesia Plan(s) and associated risks, benefits, and realistic alternatives discussed. Questions answered and patient/representative(s) expressed understanding.     - Discussed with:  Patient         Postoperative Care    Pain management: Multi-modal analgesia.   PONV prophylaxis: Ondansetron (or other 5HT-3), Dexamethasone or Solumedrol     Comments:                Mckenzie Hobbs MD

## 2021-05-07 NOTE — ED NOTES
"[facility]  ED Nurse Handoff Report    ED Chief complaint: Abdominal Pain      ED Diagnosis:   Final diagnoses:   Acute appendicitis with localized peritonitis, without perforation, abscess, or gangrene       Code Status: has not been addressed    Allergies:   Allergies   Allergen Reactions     Sulfa Drugs        Patient Story: began having pain centered just below umbilicus that settled in right lower abdomen and became more severe.  Focused Assessment:  Pain to right lower abdomen. Denies urinary symptoms or constipation.    Treatments and/or interventions provided: IV antibiotics, IV fluids, IV zofran, dilaudid, and toradol, labs, CT abdomen, UA  Patient's response to treatments and/or interventions: stable    To be done/followed up on inpatient unit:  surgery    Does this patient have any cognitive concerns?: none    Activity level - Baseline/Home:  Independent  Activity Level - Current:   Independent    Patient's Preferred language: English   Needed?: No    Isolation: None  Infection: Not Applicable  Patient tested for COVID 19 prior to admission: YES  Bariatric?: No    Vital Signs:   Vitals:    05/06/21 1929   BP: (!) 166/104   Pulse: 104   Resp: 20   Temp: 98.1  F (36.7  C)   TempSrc: Oral   SpO2: 99%   Weight: 77.1 kg (170 lb)   Height: 1.676 m (5' 6\")       Cardiac Rhythm:     Was the PSS-3 completed:   Yes  What interventions are required if any?               Family Comments: none present  OBS brochure/video discussed/provided to patient/family: Yes              Name of person given brochure if not patient:               Relationship to patient:     For the majority of the shift this patient's behavior was Green.   Behavioral interventions performed were .    ED NURSE PHONE NUMBER: [phone]         "

## 2021-05-08 ENCOUNTER — COMMUNICATION - HEALTHEAST (OUTPATIENT)
Dept: SCHEDULING | Facility: CLINIC | Age: 31
End: 2021-05-08

## 2021-05-10 LAB — COPATH REPORT: NORMAL

## 2021-05-12 NOTE — DISCHARGE SUMMARY
Admission Date: 5/6/2021  Dismissal Date: 5/7/2021    Diagnoses:  Patient Active Problem List   Diagnosis     Acute appendicitis with localized peritonitis, without perforation, abscess, or gangrene       Consultants:  None    Procedures Performed:  Laparoscopic appendectomy    Brief Clinical History: Please see the H&P    Hospital Course:  Patient had a non-complicated postoperative course. No significant complications occurred and the patient was ready for dismissal to home after meeting all discharge criteria.      Instructions:  Diet: Return to preoperative diet as tolerated.  Activity: Slowly return to regular activity as tolerated.  Medications: Resume home medications, Norco  Followup: 2 weeks in surgical office if ongoing issues    Questions answered.    Condition on discharge: Stable

## 2021-05-27 ASSESSMENT — PATIENT HEALTH QUESTIONNAIRE - PHQ9: SUM OF ALL RESPONSES TO PHQ QUESTIONS 1-9: 12

## 2021-05-28 ASSESSMENT — ANXIETY QUESTIONNAIRES: GAD7 TOTAL SCORE: 6

## 2021-06-06 ENCOUNTER — TELEPHONE (OUTPATIENT)
Dept: OTHER | Facility: CLINIC | Age: 31
End: 2021-06-06

## 2021-06-06 NOTE — TELEPHONE ENCOUNTER
[facility]  Post op call note - Laparoscopic appendectomy    Mulugeta Valentin was called for an update regarding his recovery.  He underwent a laparoscopic appendectomy by Dr. Beck on 5/7/21.  Today he tells me he is doing well and denies any complaints.  He currently does not need any pain medications.  He is eating a normal diet and his bowels are regular.  The patient states he is resuming normal activity.  He states his wounds are healing well.  He denies any erythema or drainage at his wounds.    The pathology revealed acute appendicitis.  This was discussed with the patient.  He was advised to advance his activity as tolerated.  The patient states all of his questions were answered and he understands our discussion.  He agrees to follow up as needed and to call our office with any concerns.    Kranthi Wilde PA-C  Please route or send letter to:  Primary Care Provider (PCP)

## 2021-06-11 NOTE — TELEPHONE ENCOUNTER
FYI - Status Update  Who is Calling: Patient  Update: The patient returned the call and is confirming he can have his video call at 12:00 pm on 9/4.  Okay to leave a detailed message?:  No return call needed

## 2021-06-11 NOTE — TELEPHONE ENCOUNTER
Left message to call back for: upcoming appt  Information to relay to patient:  LMTCB,   Please see if patient is ok to do video visit at 12 pm instead of 10:30 am

## 2021-06-11 NOTE — PROGRESS NOTES
"Mulugeta Valentin is a 30 y.o. male who is being evaluated via a billable telephone visit.      The patient has been notified of following:     \"This telephone visit will be conducted via a call between you and your physician/provider. We have found that certain health care needs can be provided without the need for a physical exam.  This service lets us provide the care you need with a short phone conversation.  If a prescription is necessary we can send it directly to your pharmacy.  If lab work is needed we can place an order for that and you can then stop by our lab to have the test done at a later time.    Telephone visits are billed at different rates depending on your insurance coverage. During this emergency period, for some insurers they may be billed the same as an in-person visit.  Please reach out to your insurance provider with any questions.    If during the course of the call the physician/provider feels a telephone visit is not appropriate, you will not be charged for this service.\"    Patient has given verbal consent to a Telephone visit? Yes    What phone number would you like to be contacted at?     Patient would like to receive their AVS by AVS Preference: Germán.    Additional provider notes: phone visit due to video problems 30-year-old male      SUBJECTIVE:   Chief Complaint   Patient presents with     Depression     Would like to discuss starting medication     Establish Care     New patient      Mulugeta Valentin 30 y.o. male    Current Outpatient Medications   Medication Sig Dispense Refill     buPROPion (WELLBUTRIN XL) 150 MG 24 hr tablet Take 1 tablet (150 mg total) by mouth daily. 30 tablet 1     No current facility-administered medications for this visit.      Allergies: Sulfa (sulfonamide antibiotics)   No LMP for male patient.    HPI:   Establishing care with me today interested in starting medications for depression.  Patient's father passed a couple years ago.  Patient has recently " come out of the relationship that was long-term.  He has some other stressors going on at this time and has noted increasing symptoms of depression.  He has a family history of depression and he is interested in medication.  He has no thoughts of hurting himself or others.  We discussed different medication options today.  He is interested in bupropion with the possibility to help him stop smoking as well.  We discussed 150 mg daily.  Discussed follow-up in 1 month.  Discussed contact me sooner if having any side effects of the medication.    ROS: negative except as per HPI    OBJECTIVE:   Phone visit- no exam  Pt sounded well on the phone  There were no vitals taken for this visit.      Assessment/Plan:  1. Mild major depression (H)  Patient would like to start an SSRI  Discussed different medication options  While this patient started on bupropion to help not only with smoking cessation but also depression  Patient will follow-up with me in 1 month sooner if have any side effects of the medication  - buPROPion (WELLBUTRIN XL) 150 MG 24 hr tablet; Take 1 tablet (150 mg total) by mouth daily.  Dispense: 30 tablet; Refill: 1        Phone call duration:  13 minutes    Vanessa Mcbride CMA

## 2021-06-16 NOTE — TELEPHONE ENCOUNTER
Former patient of ??? & has not established care with another provider.  Please assign refill request to covering provider per Clinic standard process.      RN cannot approve Refill Request    RN can NOT refill this medication no pcp. Last office visit: Visit date not found Last Physical: Visit date not found Last MTM visit: Visit date not found Last visit same specialty: Visit date not found.  Next visit within 3 mo: Visit date not found  Next physical within 3 mo: Visit date not found      Dimitry Luciano, [facility] Connection Triage/Med Refill 3/31/2021    Requested Prescriptions   Pending Prescriptions Disp Refills     buPROPion (WELLBUTRIN XL) 300 MG 24 hr tablet [Pharmacy Med Name: BUPROPION HCL  MG TABLET] 90 tablet 1     Sig: TAKE 1 TABLET BY MOUTH EVERY DAY       Tricyclics/Misc Antidepressant/Antianxiety Meds Refill Protocol Passed - 3/30/2021  7:42 AM        Passed - PCP or prescribing provider visit in last year     Last office visit with prescriber/PCP: Visit date not found OR same dept: Visit date not found OR same specialty: Visit date not found  Last physical: Visit date not found Last MTM visit: Visit date not found   Next visit within 3 mo: Visit date not found  Next physical within 3 mo: Visit date not found  Prescriber OR PCP: Moises Orozco MD  Last diagnosis associated with med order: 1. Mild major depression (H)  - buPROPion (WELLBUTRIN XL) 300 MG 24 hr tablet [Pharmacy Med Name: BUPROPION HCL  MG TABLET]; TAKE 1 TABLET BY MOUTH EVERY DAY  Dispense: 90 tablet; Refill: 1    If protocol passes may refill for 12 months if within 3 months of last provider visit (or a total of 15 months).

## 2021-06-17 NOTE — TELEPHONE ENCOUNTER
Has been having over the last 4 houjrs    Sharp pain under his belly button    Dull lower back pain    Chills (Can't warm up)    Emesis x1      Has received both Pfizer covid vaccinations.    [facility] provider on call Dr. Pearza. Have patient go to Urgent care or the ED,   Writer called patient back he will need to call a family member to drive him to the ED.    Chichi Almendarez RN, MA  [facility] Triage Nurse Advisor     Reason for Disposition    Constant abdominal pain lasting > 2 hours    Additional Information    Negative: Passed out (i.e., fainted, collapsed and was not responding)    Negative: Shock suspected (e.g., cold/pale/clammy skin, too weak to stand, low BP, rapid pulse)    Negative: Sounds like a life-threatening emergency to the triager    Negative: SEVERE abdominal pain (e.g., excruciating)    Negative: Vomiting red blood or black (coffee ground) material    Negative: Bloody, black, or tarry bowel movements (Exception: chronic-unchanged black-grey bowel movements and is taking iron pills or Pepto-bismol)    Negative: Unable to urinate (or only a few drops) and bladder feels very full    Negative: Pain in scrotum persists > 1 hour    Negative: Chest pain    Negative: Pain is mainly in upper abdomen (if needed ask: 'is it mainly above the belly button?')    Protocols used: ABDOMINAL PAIN - MALE-A-OH    COVID 19 Nurse Triage Plan/Patient Instructions    Please be aware that novel coronavirus (COVID-19) may be circulating in the community. If you develop symptoms such as fever, cough, or SOB or if you have concerns about the presence of another infection including coronavirus (COVID-19), please contact your health care provider or visit  [email or web address].    Disposition/Instructions    ED Visit recommended. Follow protocol based instructions.      Bring Your Own Device:  Please also bring your smart device(s) (smart phones, tablets, laptops) and their charging cables for your  personal use and to communicate with your care team during your visit.      Thank you for taking steps to prevent the spread of this virus.  o Limit your contact with others.  o Wear a simple mask to cover your cough.  o Wash your hands well and often.    Resources    [facility]: About COVID-19: [email or web address]    CDC: What to Do If You're Sick: www.cdc.gov/coronavirus/2019-ncov/about/steps-when-sick.html    CDC: Ending Home Isolation: www.cdc.gov/coronavirus/2019-ncov/hcp/disposition-in-home-patients.html     CDC: Caring for Someone: www.cdc.gov/coronavirus/2019-ncov/if-you-are-sick/care-for-someone.html     [facility]: Interim Guidance for Hospital Discharge to Home: www.[facility].[email or web address]/diseases/coronavirus/hcp/hospdischarge.pdf    [facility] clinical trials (COVID-19 research studies): [email or web address]-clinical-trials     Below are the COVID-19 hotlines at the [place] [facility] ([facility]). Interpreters are available.   o For health questions: Call 789-958-8479 or 1-497.212.1928 (7 a.m. to 7 p.m.)  o For questions about schools and childcare: Call 222-052-9679 or 1-174.100.1978 (7 a.m. to 7 p.m.)

## 2021-06-27 ENCOUNTER — HEALTH MAINTENANCE LETTER (OUTPATIENT)
Age: 31
End: 2021-06-27

## 2021-10-17 ENCOUNTER — HEALTH MAINTENANCE LETTER (OUTPATIENT)
Age: 31
End: 2021-10-17

## 2022-07-24 ENCOUNTER — HEALTH MAINTENANCE LETTER (OUTPATIENT)
Age: 32
End: 2022-07-24

## 2022-10-01 ENCOUNTER — HEALTH MAINTENANCE LETTER (OUTPATIENT)
Age: 32
End: 2022-10-01

## 2022-12-16 ASSESSMENT — ANXIETY QUESTIONNAIRES
1. FEELING NERVOUS, ANXIOUS, OR ON EDGE: MORE THAN HALF THE DAYS
6. BECOMING EASILY ANNOYED OR IRRITABLE: MORE THAN HALF THE DAYS
3. WORRYING TOO MUCH ABOUT DIFFERENT THINGS: MORE THAN HALF THE DAYS
GAD7 TOTAL SCORE: 9
GAD7 TOTAL SCORE: 9
7. FEELING AFRAID AS IF SOMETHING AWFUL MIGHT HAPPEN: SEVERAL DAYS
4. TROUBLE RELAXING: SEVERAL DAYS
5. BEING SO RESTLESS THAT IT IS HARD TO SIT STILL: NOT AT ALL
GAD7 TOTAL SCORE: 9
7. FEELING AFRAID AS IF SOMETHING AWFUL MIGHT HAPPEN: SEVERAL DAYS
IF YOU CHECKED OFF ANY PROBLEMS ON THIS QUESTIONNAIRE, HOW DIFFICULT HAVE THESE PROBLEMS MADE IT FOR YOU TO DO YOUR WORK, TAKE CARE OF THINGS AT HOME, OR GET ALONG WITH OTHER PEOPLE: VERY DIFFICULT
8. IF YOU CHECKED OFF ANY PROBLEMS, HOW DIFFICULT HAVE THESE MADE IT FOR YOU TO DO YOUR WORK, TAKE CARE OF THINGS AT HOME, OR GET ALONG WITH OTHER PEOPLE?: VERY DIFFICULT
2. NOT BEING ABLE TO STOP OR CONTROL WORRYING: SEVERAL DAYS

## 2022-12-16 ASSESSMENT — PATIENT HEALTH QUESTIONNAIRE - PHQ9
SUM OF ALL RESPONSES TO PHQ QUESTIONS 1-9: 17
SUM OF ALL RESPONSES TO PHQ QUESTIONS 1-9: 17
10. IF YOU CHECKED OFF ANY PROBLEMS, HOW DIFFICULT HAVE THESE PROBLEMS MADE IT FOR YOU TO DO YOUR WORK, TAKE CARE OF THINGS AT HOME, OR GET ALONG WITH OTHER PEOPLE: VERY DIFFICULT

## 2022-12-23 ENCOUNTER — OFFICE VISIT (OUTPATIENT)
Dept: FAMILY MEDICINE | Facility: CLINIC | Age: 32
End: 2022-12-23
Payer: COMMERCIAL

## 2022-12-23 VITALS
TEMPERATURE: 97 F | BODY MASS INDEX: 26.09 KG/M2 | WEIGHT: 162.38 LBS | HEIGHT: 66 IN | SYSTOLIC BLOOD PRESSURE: 133 MMHG | HEART RATE: 100 BPM | OXYGEN SATURATION: 98 % | DIASTOLIC BLOOD PRESSURE: 104 MMHG | RESPIRATION RATE: 20 BRPM

## 2022-12-23 DIAGNOSIS — F33.0 MILD EPISODE OF RECURRENT MAJOR DEPRESSIVE DISORDER (H): Primary | ICD-10-CM

## 2022-12-23 DIAGNOSIS — F41.9 ANXIETY: ICD-10-CM

## 2022-12-23 PROCEDURE — 99213 OFFICE O/P EST LOW 20 MIN: CPT | Performed by: FAMILY MEDICINE

## 2022-12-23 RX ORDER — SERTRALINE HYDROCHLORIDE 25 MG/1
25 TABLET, FILM COATED ORAL DAILY
Qty: 90 TABLET | Refills: 1 | Status: SHIPPED | OUTPATIENT
Start: 2022-12-23 | End: 2023-01-20

## 2022-12-23 RX ORDER — BUPROPION HYDROCHLORIDE 300 MG/1
300 TABLET ORAL DAILY
Qty: 30 TABLET | Refills: 0 | Status: SHIPPED | OUTPATIENT
Start: 2022-12-23 | End: 2023-02-03

## 2022-12-23 ASSESSMENT — PATIENT HEALTH QUESTIONNAIRE - PHQ9
10. IF YOU CHECKED OFF ANY PROBLEMS, HOW DIFFICULT HAVE THESE PROBLEMS MADE IT FOR YOU TO DO YOUR WORK, TAKE CARE OF THINGS AT HOME, OR GET ALONG WITH OTHER PEOPLE: VERY DIFFICULT
SUM OF ALL RESPONSES TO PHQ QUESTIONS 1-9: 17

## 2022-12-23 ASSESSMENT — ANXIETY QUESTIONNAIRES: GAD7 TOTAL SCORE: 9

## 2022-12-23 NOTE — PROGRESS NOTES
"  Assessment & Plan     Mild episode of recurrent major depressive disorder (H)  The past couple years patient been doing well previously with Wellbutrin but has noted increasing amounts of anxiety in the last 6 months on top of his baseline depression  Discussed options today and have elected to add Zoloft to the Wellbutrin  He will update me in a few weeks with change of symptoms  Contact me sooner if any worsening symptoms or side effects  - sertraline (ZOLOFT) 25 MG tablet; Take 1 tablet (25 mg) by mouth daily  - buPROPion (WELLBUTRIN XL) 300 MG 24 hr tablet; Take 1 tablet (300 mg) by mouth daily    Anxiety  Please see above         BMI:   Estimated body mass index is 26.21 kg/m  as calculated from the following:    Height as of this encounter: 1.676 m (5' 6\").    Weight as of this encounter: 73.7 kg (162 lb 6 oz).       Depression Screening Follow Up    PHQ 12/16/2022   PHQ-9 Total Score 17   Q9: Thoughts of better off dead/self-harm past 2 weeks Not at all     Last PHQ-9 12/16/2022   1.  Little interest or pleasure in doing things 3   2.  Feeling down, depressed, or hopeless 3   3.  Trouble falling or staying asleep, or sleeping too much 1   4.  Feeling tired or having little energy 3   5.  Poor appetite or overeating 1   6.  Feeling bad about yourself 3   7.  Trouble concentrating 3   8.  Moving slowly or restless 0   Q9: Thoughts of better off dead/self-harm past 2 weeks 0   PHQ-9 Total Score 17   Difficulty at work, home, or with people -         No follow-ups on file.    Moises Orozco MD  [facility]    Anna Dalal is a 32 year old presenting for the following health issues:  Anxiety and Depression  Patient with a past history of depression previously using bupropion with successful treatment of depression has noted in the last few months to have increasing amounts of anxiety and possible panic attacks  1 started on bupropion years ago he was not having symptoms " "of anxiety  Recently purchase his own home and has been living by himself stating he has caused a lot of self reflection time that he did have prior and may be heightened some of his anxiety-he is interested in adding something to the bupropion to see if it helps with anxiety we discussed sertraline  He will try this over the next few weeks and contact me with an update sooner if any side effects or problems noted or worsening symptoms.    History of Present Illness       Mental Health Follow-up:  Patient presents to follow-up on Depression & Anxiety.Patient's depression since last visit has been:  Bad  The patient is not having other symptoms associated with depression.  Patient's anxiety since last visit has been:  Bad  The patient is not having other symptoms associated with anxiety.  Any significant life events: No, job concerns, financial concerns, housing concerns and grief or loss  Patient is feeling anxious or having panic attacks.  Patient has no concerns about alcohol or drug use.    He eats 2-3 servings of fruits and vegetables daily.He consumes 2 sweetened beverage(s) daily.He exercises with enough effort to increase his heart rate 10 to 19 minutes per day.  He exercises with enough effort to increase his heart rate 4 days per week.   He is taking medications regularly.    Today's PHQ-9         PHQ-9 Total Score: 17    PHQ-9 Q9 Thoughts of better off dead/self-harm past 2 weeks :   Not at all    How difficult have these problems made it for you to do your work, take care of things at home, or get along with other people: Very difficult  Today's ANANDA-7 Score: 9             Review of Systems         Objective    BP (!) 133/104 (BP Location: Left arm, Patient Position: Sitting, Cuff Size: Adult Regular)   Pulse 100   Temp 97  F (36.1  C) (Oral)   Resp 20   Ht 1.676 m (5' 6\")   Wt 73.7 kg (162 lb 6 oz)   SpO2 98%   BMI 26.21 kg/m    Body mass index is 26.21 kg/m .  Physical Exam   GENERAL: healthy, " alert and no distress  RESP: lungs clear to auscultation - no rales, rhonchi or wheezes  CV: regular rate and rhythm, normal S1 S2, no S3 or S4, no murmur, click or rub, no peripheral edema and peripheral pulses strong  MS: no gross musculoskeletal defects noted, no edema  PSYCH: mentation appears normal, affect normal/bright

## 2023-01-20 DIAGNOSIS — F33.0 MILD EPISODE OF RECURRENT MAJOR DEPRESSIVE DISORDER (H): ICD-10-CM

## 2023-02-03 ENCOUNTER — MYC MEDICAL ADVICE (OUTPATIENT)
Dept: FAMILY MEDICINE | Facility: CLINIC | Age: 33
End: 2023-02-03
Payer: COMMERCIAL

## 2023-02-03 DIAGNOSIS — F33.0 MILD EPISODE OF RECURRENT MAJOR DEPRESSIVE DISORDER (H): ICD-10-CM

## 2023-02-03 RX ORDER — BUPROPION HYDROCHLORIDE 300 MG/1
300 TABLET ORAL DAILY
Qty: 90 TABLET | Refills: 1 | Status: SHIPPED | OUTPATIENT
Start: 2023-02-03 | End: 2023-05-05

## 2023-02-16 ENCOUNTER — MYC MEDICAL ADVICE (OUTPATIENT)
Dept: FAMILY MEDICINE | Facility: CLINIC | Age: 33
End: 2023-02-16
Payer: COMMERCIAL

## 2023-02-16 DIAGNOSIS — F33.41 RECURRENT MAJOR DEPRESSIVE DISORDER, IN PARTIAL REMISSION (H): Primary | ICD-10-CM

## 2023-02-20 RX ORDER — BUPROPION HYDROCHLORIDE 150 MG/1
150 TABLET ORAL EVERY MORNING
Qty: 90 TABLET | Refills: 1 | Status: SHIPPED | OUTPATIENT
Start: 2023-02-20 | End: 2023-05-05

## 2023-04-13 DIAGNOSIS — R41.840 ATTENTION DEFICIT: Primary | ICD-10-CM

## 2023-05-04 ENCOUNTER — MYC MEDICAL ADVICE (OUTPATIENT)
Dept: FAMILY MEDICINE | Facility: CLINIC | Age: 33
End: 2023-05-04
Payer: COMMERCIAL

## 2023-05-04 DIAGNOSIS — F33.41 RECURRENT MAJOR DEPRESSIVE DISORDER, IN PARTIAL REMISSION (H): ICD-10-CM

## 2023-05-04 DIAGNOSIS — F33.0 MILD EPISODE OF RECURRENT MAJOR DEPRESSIVE DISORDER (H): ICD-10-CM

## 2023-05-05 RX ORDER — BUPROPION HYDROCHLORIDE 150 MG/1
150 TABLET ORAL EVERY MORNING
Qty: 90 TABLET | Refills: 1 | Status: SHIPPED | OUTPATIENT
Start: 2023-05-05 | End: 2023-11-13

## 2023-05-05 RX ORDER — BUPROPION HYDROCHLORIDE 300 MG/1
300 TABLET ORAL DAILY
Qty: 90 TABLET | Refills: 1 | Status: SHIPPED | OUTPATIENT
Start: 2023-05-05 | End: 2023-11-13

## 2023-08-12 ENCOUNTER — HEALTH MAINTENANCE LETTER (OUTPATIENT)
Age: 33
End: 2023-08-12

## 2023-08-14 DIAGNOSIS — F33.0 MILD EPISODE OF RECURRENT MAJOR DEPRESSIVE DISORDER (H): ICD-10-CM

## 2023-08-14 NOTE — TELEPHONE ENCOUNTER
"Routing refill request to provider for review/approval because:  Medication is reported/historical    harmacy    [facility] [place], [place] - [address]     This Order Has Been Discontinued    Order Status Reason By On   Discontinued Reorder (No AVS) Moises Orozco MD 1/20/23 0904       Last Written Prescription Date:  12/23/22  Last Fill Quantity: 90,  # refills:1  Last office visit provider:  12/23/22     Requested Prescriptions   Pending Prescriptions Disp Refills    sertraline (ZOLOFT) 50 MG tablet [Pharmacy Med Name: SERTRALINE HCL 50MG TABS] 90 tablet 1     Sig: TAKE ONE TABLET BY MOUTH ONCE DAILY       SSRIs Protocol Failed - 8/14/2023  7:42 AM        Failed - PHQ-9 score less than 5 in past 6 months     Please review last PHQ-9 score.           Failed - Recent (6 mo) or future (30 days) visit within the authorizing provider's specialty     Patient had office visit in the last 6 months or has a visit in the next 30 days with authorizing provider or within the authorizing provider's specialty.  See \"Patient Info\" tab in inbasket, or \"Choose Columns\" in Meds & Orders section of the refill encounter.            Passed - Medication is active on med list        Passed - Patient is age 18 or older             Sugar Hernandez RN 08/14/23 9:09 AM  "

## 2023-08-18 ENCOUNTER — MYC MEDICAL ADVICE (OUTPATIENT)
Dept: FAMILY MEDICINE | Facility: CLINIC | Age: 33
End: 2023-08-18
Payer: COMMERCIAL

## 2023-08-18 DIAGNOSIS — F33.0 MILD EPISODE OF RECURRENT MAJOR DEPRESSIVE DISORDER (H): ICD-10-CM

## 2023-08-21 NOTE — TELEPHONE ENCOUNTER
Pended Zoloft 50mg daily,    90+1. Patient due for visit in December.    Sanjiv Dowell RN     [facility]

## 2023-09-26 ENCOUNTER — VIRTUAL VISIT (OUTPATIENT)
Dept: PSYCHOLOGY | Facility: CLINIC | Age: 33
End: 2023-09-26
Attending: FAMILY MEDICINE
Payer: COMMERCIAL

## 2023-09-26 DIAGNOSIS — F88 DEVELOPMENTAL MENTAL DISORDER: ICD-10-CM

## 2023-09-26 DIAGNOSIS — F33.42 MAJOR DEPRESSIVE DISORDER, RECURRENT EPISODE, IN FULL REMISSION (H): Primary | ICD-10-CM

## 2023-09-26 PROCEDURE — 90832 PSYTX W PT 30 MINUTES: CPT | Mod: 95 | Performed by: PSYCHOLOGIST

## 2023-09-26 ASSESSMENT — COLUMBIA-SUICIDE SEVERITY RATING SCALE - C-SSRS
ATTEMPT LIFETIME: NO
6. HAVE YOU EVER DONE ANYTHING, STARTED TO DO ANYTHING, OR PREPARED TO DO ANYTHING TO END YOUR LIFE?: NO
TOTAL  NUMBER OF ABORTED OR SELF INTERRUPTED ATTEMPTS LIFETIME: NO
TOTAL  NUMBER OF INTERRUPTED ATTEMPTS LIFETIME: NO
1. HAVE YOU WISHED YOU WERE DEAD OR WISHED YOU COULD GO TO SLEEP AND NOT WAKE UP?: NO
2. HAVE YOU ACTUALLY HAD ANY THOUGHTS OF KILLING YOURSELF?: NO

## 2023-09-26 ASSESSMENT — ANXIETY QUESTIONNAIRES
7. FEELING AFRAID AS IF SOMETHING AWFUL MIGHT HAPPEN: NOT AT ALL
1. FEELING NERVOUS, ANXIOUS, OR ON EDGE: NOT AT ALL
GAD7 TOTAL SCORE: 1
GAD7 TOTAL SCORE: 1
5. BEING SO RESTLESS THAT IT IS HARD TO SIT STILL: NOT AT ALL
6. BECOMING EASILY ANNOYED OR IRRITABLE: NOT AT ALL
2. NOT BEING ABLE TO STOP OR CONTROL WORRYING: NOT AT ALL
3. WORRYING TOO MUCH ABOUT DIFFERENT THINGS: NOT AT ALL

## 2023-09-26 ASSESSMENT — PATIENT HEALTH QUESTIONNAIRE - PHQ9
5. POOR APPETITE OR OVEREATING: SEVERAL DAYS
SUM OF ALL RESPONSES TO PHQ QUESTIONS 1-9: 4
SUM OF ALL RESPONSES TO PHQ QUESTIONS 1-9: 4
10. IF YOU CHECKED OFF ANY PROBLEMS, HOW DIFFICULT HAVE THESE PROBLEMS MADE IT FOR YOU TO DO YOUR WORK, TAKE CARE OF THINGS AT HOME, OR GET ALONG WITH OTHER PEOPLE: VERY DIFFICULT

## 2023-09-26 NOTE — PROGRESS NOTES
[facility]   Mental Health & Addiction Services     Progress Note - Initial Visit    Client Name:  Mulugeta Valentin Date: 2023         Service Type: Individual     Visit Start Time: 3:00pm  Visit End Time: 3:32pm    Visit #: 1    Attendees: Client attended alone    Service Modality:  Video Visit:      Provider verified identity through the following two step process.  Patient provided:  Patient  and Patient address    Telemedicine Visit: The patient's condition can be safely assessed and treated via synchronous audio and visual telemedicine encounter.      Reason for Telemedicine Visit: Services only offered telehealth    Originating Site (Patient Location): Patient's home    Distant Site (Provider Location): Provider Remote Setting- Home Office    Consent:  The patient/guardian has verbally consented to: the potential risks and benefits of telemedicine (video visit) versus in person care; bill my insurance or make self-payment for services provided; and responsibility for payment of non-covered services.     Patient would like the video invitation sent by:  Send to e-mail at: [email or web address]    Mode of Communication:  [facility] Conference [facility]    Distant Location (Provider):  Off-site    As the provider I attest to compliance with applicable laws and regulations related to telemedicine.       DATA:  Extended Session (53+ minutes): No  Interactive Complexity: No   Crisis: No     Presenting Concerns/Current Stressors:   Patient presented to session to initiate the ADHD evaluation process.           2020    12:00 PM 2022    10:09 AM 2023     1:41 PM   PHQ   PHQ-9 Total Score 12 17 4   Q9: Thoughts of better off dead/self-harm past 2 weeks Not at all Not at all Not at all            2020    12:00 PM 2022    10:09 AM 2023     1:41 PM   ANANDA-7 SCORE   Total Score  9 (mild anxiety)    Total Score 6 9 1       ASSESSMENT:  Mental Status  Assessment:  Appearance:   Appropriate   Eye Contact:   Good   Psychomotor Behavior: Normal   Attitude:   Cooperative   Orientation:   All  Speech   Rate / Production: Normal/ Responsive   Volume:  Normal   Mood:    Normal  Affect:    Appropriate   Thought Content:  Clear   Thought Form:  Logical   Insight:    Good       Safety Issues and Plan for Safety and Risk Management:   Brandt Suicide Severity Rating Scale (Lifetime/Recent)      9/26/2023     3:05 PM   Brandt Suicide Severity Rating (Lifetime/Recent)   Q1 Wish to be Dead (Lifetime) N   Q2 Non-Specific Active Suicidal Thoughts (Lifetime) N   Actual Attempt (Lifetime) N   Has subject engaged in non-suicidal self-injurious behavior? (Lifetime) N   Interrupted Attempts (Lifetime) N   Aborted or Self-Interrupted Attempt (Lifetime) N   Preparatory Acts or Behavior (Lifetime) N   Calculated C-SSRS Risk Score (Lifetime/Recent) No Risk Indicated     Patient denies current fears or concerns for personal safety.  Patient denies current or recent suicidal ideation or behaviors.  Patient denies current or recent homicidal ideation or behaviors.  Patient denies current or recent self injurious behavior or ideation.  Patient denies other safety concerns.  Recommended that patient call 911 or go to the local ED should there be a change in any of these risk factors.   Patient reports there are no firearms in the house.    Diagnostic Criteria:  F33.42:  A. Five (or more) symptoms have been present during the same 2-week period and represent a change from previous functioning; at least one of the symptoms is either (1) depressed mood or (2) loss of interest or pleasure.   Diminished interest or pleasure in all, or almost all, activities.   Fatigue or loss of energy.   Diminished ability to think or concentrate, or indecisiveness.   B. The symptoms cause clinically significant distress or impairment in social, occupational, or other important areas of functioning.  C. The  episode is not attributable to the physiological effects of a substance or to another medical condition.  D. The occurence of major depressive episode is not better explained by other thought / psychotic disorders.  E. There has never been a manic episode or hypomanic episode.     F88:  A. A persistent pattern of inattention and/or hyperactivity-impulsivity that interferes with functioning or development, as characterized by (1) and/or (2):   1. Six or more inattention symptoms that have persisted for at least 6 months to a degree that is inconsistent with developmental level and that negatively impacts directly on social and academic/occupational activities.   2. Six or more hyperactivity and impulsivity symptoms that have persisted for at least 6 months to a degree that is inconsistent with developmental level and that negatively impacts directly on social and academic/occupational activities.  B. Several symptoms (inattentive or hyperactive/impulsive) were present before the age of 12 years.  C. Several symptoms (inattentive or hyperactive/impulsive) present in ?2 settings (eg, at home, school, or work; with friends or relatives; in other activities).  D. There is clear evidence that the symptoms interfere with or reduce the quality of social, academic, or occupational functioning.  E. Symptoms do not occur exclusively during the course of schizophrenia or another psychotic disorder, and are not better explained by another mental disorder (eg, mood disorder, anxiety disorder, dissociative disorder, personality disorder, substance intoxication, or withdrawal).    DSM5 Diagnoses: (Sustained by DSM5 Criteria Listed Above)  Diagnoses:   1. Major depressive disorder, recurrent episode, in full remission (H)    2. Developmental mental disorder     Rule out ADHD  Psychosocial & Contextual Factors: social support, employed, diagnosed with ADHD in the past    PROMIS-10 Scores  Global Mental Health Score: (P) 11  Global  Physical Health Score: (P) 16   PROMIS TOTAL - SUBSCORES: (P) 27      Intervention:              Reviewed symptoms and history of presenting concern. Patient endorsed symptoms consistent with depression  and ADHD. Depression being treated with psychotropic medication. Patient denied symptoms associated with anxiety , luis, panic, OCD, trauma, and perceptual difficulties. Unable to complete diagnostic intake, will be completed in next session.   EFT: empathetic attunement, emotion checking, emotion naming  MI: open ended questions, affirmations, reflections        Attendance Agreement:  Client has not signed the attendance agreement. Discussed expectations at beginning of this first session and patient agreed.       PLAN:  Provider will continue Diagnostic Assessment in next session. Patient will complete Mukesh questionnaires  prior to next session (10/11/2023).    Patient meets the following risk assessment and triage: Patient denied any current/recent/lifetime history of suicidal ideation and/or behaviors.  No safety plan indicated at this time.     Medical necessity criteria is warranted in order to: Measure a psychological disorder and its severity and functional impairment to determine psychiatric diagnosis when a mental illness is suspected, or to achieve a differential diagnosis from a range of medical/psychological disorders that present with similar constellations of symptoms (e.g., determination and measurement of anxiety severity and impact in the presence of ongoing asthma or heart disease), Perform symptom measurement to objectively measure treatment effectiveness and/or determine the need to refer for pharmacological treatment or other medical evaluation (e.g., based on severity and chronicity of symptoms), and Evaluate primary symptoms of impaired attention and concentration that can occur in many neurological and psychiatric conditions.    Medical necessity for psychological assessment is warranted  as a result of the following: (1) A specific clinical question is posed that relates to the condition/symptoms being addressed (2) The question cannot be adequately addressed by clinical interview and/or behavioral observation (3) Results of psychological testing are reasonably expected to provide an answer to the query (4) It is reasonably expected that the testing will provide information leading to a clearer diagnosis and/or guide treatment planning with an expectation of improved clinical outcome.    I acknowledge that, based upon current clinical information, the patient and I have reviewed and discussed issues pertaining to the purpose of therapy/testing, potential therapeutic goals, procedures, risks and benefits, and estimated duration of therapy/testing. Issues pertaining to fees/insurance and confidentiality were also addressed with the patient, who indicated understanding and elected to continue with appointments. I will not be providing any experimental procedures and, if we agree that a change in clinical procedure would be more beneficial, I will obtain specific consent for that procedure or refer you to another provider who has expertise in that area.       Jeri Garza PsyD,   Clinical Psychologist

## 2023-10-11 ENCOUNTER — VIRTUAL VISIT (OUTPATIENT)
Dept: PSYCHOLOGY | Facility: CLINIC | Age: 33
End: 2023-10-11
Payer: COMMERCIAL

## 2023-10-11 DIAGNOSIS — F88 DEVELOPMENTAL MENTAL DISORDER: ICD-10-CM

## 2023-10-11 DIAGNOSIS — F33.42 MAJOR DEPRESSIVE DISORDER, RECURRENT EPISODE, IN FULL REMISSION (H): Primary | ICD-10-CM

## 2023-10-11 PROCEDURE — 90791 PSYCH DIAGNOSTIC EVALUATION: CPT | Mod: 95 | Performed by: PSYCHOLOGIST

## 2023-10-11 NOTE — PROGRESS NOTES
[facility]  Provider Name:  Jeri Garza     Credentials:  JERMAINE Cleary    PATIENT'S NAME: Mulugeta Valentin  PREFERRED NAME: Madi  PRONOUNS: he/him  MRN: [id number]  : 1990  ADDRESS: [address] [place] [id number]  [facility]  [id number]  DATE OF SERVICE: 10/11/23  START TIME: 4:00pm  END TIME: 4:30pm  PREFERRED PHONE: [phone]  SERVICE MODALITY:  Video Visit:      Provider verified identity through the following two step process.  Patient provided:  Patient  and Patient address    Telemedicine Visit: The patient's condition can be safely assessed and treated via synchronous audio and visual telemedicine encounter.      Reason for Telemedicine Visit: Services only offered telehealth    Originating Site (Patient Location): Patient's home    Distant Site (Provider Location): Provider Remote Setting- Home Office    Consent:  The patient/guardian has verbally consented to: the potential risks and benefits of telemedicine (video visit) versus in person care; bill my insurance or make self-payment for services provided; and responsibility for payment of non-covered services.     Patient would like the video invitation sent by:  Send to e-mail at: [email or web address]    Mode of Communication:  Video Conference via [employer]    Distant Location (Provider):  Off-site    As the provider I attest to compliance with applicable laws and regulations related to telemedicine.    [place] ADULT Mental Health DIAGNOSTIC ASSESSMENT    Identifying Information:  Patient is a 33 year old,  man. The pronoun use throughout this assessment reflects the patient's chosen pronoun. Patient was referred for an assessment by Moises Orozco MD. Patient attended the session alone.     Chief Complaint:   Patient reported seeking services at this time for diagnostic assessment and recommendations for treatment. Patient's presenting concerns include: Continued difficulties with focus and  distractibility.  The patient reported that he was diagnosed with ADHD in the past but discontinued his stimulant medication prescription when he withdrew from college and had problems with his health insurance.  Since being recently promoted and buying a house, he is feeling more established and able to resume treatment. Specifically, the patient reported experiencing the following symptoms: making careless mistakes/problems attending to details, difficulty sustaining attention, not following through with tasks, difficulty organizing, losing things often, being easily distracted, and being forgetful.     Patient reported that he was diagnosed with ADHD the summer after high school, prior to entering college.  Symptoms began in childhood.  He further indicated a history of depression symptoms that are currently subclinical.  He is taking his prescribed Wellbutrin and Zoloft to help manage those.  Client reported that other professional(s) are involved in providing services, as he was referred by his PCP.    Social/Family History:  Patient reported he grew up in [place], [place]. Patient was the second born of 2 children. There are no known complications during pregnancy or delivery.  The patient described that his parents remained  until his father passed away about 5 years ago.  Patient reported no difficulty with childhood peer relationships. Reported that childhood was functional.  He reported having the opportunity to participate in  in elementary school, morning computer lab, and an improv group in middle school.  Reported problems with zoning out and daydreaming in those environments.  Described his current relationships with family of origin as supportive with frequent communication.      The patient denied a history of learning disorders, special education programming, and receiving tutoring services. Patient denied a history of head injuries. As a child, he reported difficulties with paying  attention, talking excessively in class, and procrastinating homework.  The patient indicated that his problems were consistently discussed at conferences.  Further recalled difficulties with organization and misplacing/losing items.  He stated that despite problems, he earned good test scores and contributed to group projects. Recalled academic strengths in standardized testing.  The patient's highest education level is some college.  After high school, the patient began attending community college to earn general credits.  He stated that he was being treated for his ADHD diagnoses and coursework was going well.  He then transferred to the [employer].  While there, he changed majors a variety of times and was working.  As a result of his father experiencing significant health issues and working mostly full-time, the patient ultimately withdrew without completing his degree.    The patient describes his cultural background as White, American.  Cultural influences and impact on patient's life structure, values, norms, and healthcare:  Cartesian . Patient identified his preferred language to be English. Patient reported he does not need the assistance of an  or other support involved in therapy.     Patient is currently single. Patient identified their sexual orientation as heterosexual. Patient reported having zero child(crow). Patient identified mother, friends, and co-worker as part of his support system. Patient identified the quality of these relationships as good.      Patient is staying in own home/apartment. He lives alone.  Housing is stable.     Patient is currently employed full-time as an inpatient pharmacy technician.  The patient has been in this position for the past 5 years and stated that he does enjoy it.  He indicated that his own problems with his triage skills make work more stressful.. Patient reports finances are obtained through employment.     Patient has not served in  the [employer].     Patient reported that he has not been involved with the legal system. Patient denies being on probation / parole / under the jurisdiction of the court.    Patient has received a 's license. Patient denied problems with inattention/hyperactivity while driving.    Patient's Strengths and Limitations:  Patient identified the following strengths or resources that will help them succeed in treatment: friends / good social support, family support, insight, intelligence, positive work environment, motivation, strong social skills, and work ethic. Things that may interfere with the patient's success in treatment include: none identified.     Personal and Family Medical History:  Patient reported the following biological family members or relatives with mental health issues: Mother experienced Depression.  Patient previously received the following mental health diagnosis: ADHD and Depression.   Patient has received the following mental health services in the past: medication(s) from physician / PCP.   Patient is currently receiving the following services: medication(s) from physician / PCP.  Hospitalizations: None.   Previous/Current commitments: None.     Patient has had a physical exam to rule out medical causes for current symptoms. Date of last physical exam was 4/13/2023. The patient's PCP is Moises Orozco MD. Patient reported no current medical concerns. Patient denies any issues with pain.. There are not significant appetite/nutritional concerns/weight changes.    Current Outpatient Medications   Medication    buPROPion (WELLBUTRIN XL) 150 MG 24 hr tablet    buPROPion (WELLBUTRIN XL) 300 MG 24 hr tablet    multivitamin, therapeutic (THERA-VIT) TABS tablet    sertraline (ZOLOFT) 50 MG tablet     No current facility-administered medications for this visit.       Client reports taking prescribed medications as prescribed.    Patient Allergies:   Allergies   Allergen Reactions    Sulfa  Antibiotics      Medical History:   Patient Active Problem List   Diagnosis    Acute appendicitis with localized peritonitis, without perforation, abscess, or gangrene     Family history includes: family history includes Anxiety Disorder in his brother; Cancer in his father; Chronic Obstructive Pulmonary Disease in his father; Colon Cancer in his father; Depression in his brother and mother; Hyperlipidemia in his father; Hypertension in his father; Lung Cancer in his father.    Current Mental Status Exam:   Appearance:  Appropriate    Eye Contact:  Good   Psychomotor:  Normal       Gait / station:  no problem  Attitude / Demeanor: Cooperative   Speech      Rate / Production: Normal/ Responsive      Volume:  Normal  volume      Language:  intact  Mood:   Normal  Affect:   Appropriate    Thought Content: Clear   Thought Process: Logical       Associations: No loosening of associations  Insight:   Good   Judgment:  Intact   Orientation:  All  Attention/concentration: Good    Rating Scales:  PHQ9:        9/4/2020    12:00 PM 12/16/2022    10:09 AM 9/26/2023     1:41 PM   PHQ-9 SCORE   PHQ-9 Total Score MyChart  17 (Moderately severe depression) 4 (Minimal depression)   PHQ-9 Total Score 12 17 4       GAD7:        9/4/2020    12:00 PM 12/16/2022    10:09 AM 9/26/2023     1:41 PM   ANANDA-7 SCORE   Total Score  9 (mild anxiety)    Total Score 6 9 1       Substance Use:  Patient did not report a family history of substance use concerns; see medical history section for details. Patient has not received chemical dependency treatment in the past. Patient has not ever been to detox. Patient is not currently receiving any chemical dependency treatment. Patient reported  no  problems as a result of his substance use.    Alcohol: Patient reported having 3-4 beverages every other weekend.  Nicotine: None.  Cannabis: Experimentation in college.  Caffeine: At least 1 cup of coffee or energy drink each day.  Street Drugs:  None.  Prescription Drugs: None.    CAGE: None of the patient's responses to the CAGE screening were positive / Negative CAGE score     Substance Use: No symptoms    Based on the negative CAGE score and clinical interview there are not indications of drug or alcohol abuse.    Significant Losses/Trauma/Abuse/Neglect Issues:   There are indications or report of significant loss, trauma, abuse or neglect issues related to: are no indications and client denies any losses, trauma, abuse, or neglect concerns.  Concerns for possible neglect are not present.    Safety Assessment:   [place] Suicide Severity Rating Scale (Lifetime/Recent)[place] Suicide Severity Rating Scale (Lifetime/Recent)      9/26/2023     3:05 PM   [place] Suicide Severity Rating (Lifetime/Recent)   Q1 Wish to be Dead (Lifetime) N   Q2 Non-Specific Active Suicidal Thoughts (Lifetime) N   Actual Attempt (Lifetime) N   Has subject engaged in non-suicidal self-injurious behavior? (Lifetime) N   Interrupted Attempts (Lifetime) N   Aborted or Self-Interrupted Attempt (Lifetime) N   Preparatory Acts or Behavior (Lifetime) N   Calculated C-SSRS Risk Score (Lifetime/Recent) No Risk Indicated     Patient denies current homicidal ideation and behaviors.  Patient denies current self-injurious ideation and behaviors.    Patient denied risk behaviors associated with substance use.  Patient denies any high risk behaviors associated with mental health symptoms.  Patient reports the following current concerns for their personal safety: None.  Patient reports there are not firearms in the house.     History of Safety Concerns:  Patient denied a history of homicidal ideation.     Patient denied a history of personal safety concerns.    Patient denied a history of assaultive behaviors.    Patient denied a history of sexual assault behaviors.     Patient denied a history of risk behaviors associated with substance use.  Patient denies any history of high risk behaviors  associated with mental health symptoms.  Patient reports the following protective factors: forward or future oriented thinking; dedication to family or friends; safe and stable environment; regular sleep; effectively controls impulses; regular physical activity; sense of belonging; purpose; secure attachment; help seeking behaviors when distressed; abstinence from substances; adherence with prescribed medication; daily obligations; structured day; effective problem solving skills; commitment to well being; sense of meaning; positive social skills; healthy fear of risky behaviors or pain; sense of personal control or determination; access to a variety of clinical interventions and pets    Risk Plan:  See Recommendations for Safety and Risk Management Plan below.    Review of Patient-Reported Symptoms:  Depression: Lack of interest, Change in energy level, and Difficulties concentrating  Reshma:  No Symptoms  Psychosis: No Symptoms  Anxiety: Poor concentration  Panic:  No symptoms  Post Traumatic Stress Disorder:  No Symptoms   Eating Disorder: No Symptoms  ADD / ADHD:  Inattentive, Poor task completion, Poor organizational skills, Distractibility, Forgetful, and Interrupts  Conduct Disorder: No symptoms  Autism Spectrum Disorder: No observed symptoms. An autism spectrum disorder diagnosis requires specialized assessment.  Obsessive Compulsive Disorder: No Symptoms  Patient reports the following compulsive behaviors and treatment history:  No symptoms .      Diagnostic Criteria:   F88:  A. A persistent pattern of inattention and/or hyperactivity-impulsivity that interferes with functioning or development, as characterized by (1) and/or (2):   1. Six or more inattention symptoms that have persisted for at least 6 months to a degree that is inconsistent with developmental level and that negatively impacts directly on social and academic/occupational activities.   2. Six or more hyperactivity and impulsivity symptoms that  have persisted for at least 6 months to a degree that is inconsistent with developmental level and that negatively impacts directly on social and academic/occupational activities.  B. Several symptoms (inattentive or hyperactive/impulsive) were present before the age of 12 years.  C. Several symptoms (inattentive or hyperactive/impulsive) present in ?2 settings (eg, at home, school, or work; with friends or relatives; in other activities).  D. There is clear evidence that the symptoms interfere with or reduce the quality of social, academic, or occupational functioning.  E. Symptoms do not occur exclusively during the course of schizophrenia or another psychotic disorder, and are not better explained by another mental disorder (eg, mood disorder, anxiety disorder, dissociative disorder, personality disorder, substance intoxication, or withdrawal).    Functional Status:  Patient reports the following functional impairments: management of the household and or completion of tasks, organization, and work / vocational responsibilities.       PROMIS-10:   Global Mental Health Score: (P) 13  Global Physical Health Score: (P) 16   PROMIS TOTAL - SUBSCORES: (P) 29   Nonprogrammatic care: Patient is requesting basic services to address current mental health concerns.    Clinical Summary:  1. Reason for assessment: assessing reported deficits in executive functioning (rule in/out ADHD).  2. Psychosocial, cultural and contextual factors: social support, employed, diagnosed with ADHD in the past.  3. Principal DSM-5 diagnoses (Sustained by DSM5 Criteria Listed Above) and other diagnoses relevant to this service:   1. Major depressive disorder, recurrent episode, in full remission (H24)    2. Developmental mental disorder    Rule out ADHD  4. Prognosis: Expect Improvement.  5. Likely consequences of symptoms if not treated: Continued difficulties with inattention.  6. Client strengths include: employed, insightful, intelligent,  motivated, support of family, friends and providers, and supportive .     Recommendations:   Feedback session scheduled for 10/25/2023.     1. Plan for Safety and Risk Management:  Safety and Risk: Recommended that patient call 911 or go to the local ED should there be a change in any of these risk factors..         Report to child / adult protection services was NA.     2. Patient's identified mental health concerns with a cultural influence will be addressed in final recommendations.     3. Initial Treatment will focus on: ADHD testing. See above.      4. Resources/Service Plan:    services are not indicated.   Modifications to assist communication are not indicated.   Additional disability accommodations are not indicated.      5. Collaboration:   Collaboration / coordination of treatment will be initiated with the following  support professionals: primary care physician.      6.  Referrals:   The following referral(s) will be initiated: N/A.    A Release of Information has been obtained for the following: N/A.   Emergency Contact was not obtained.    Clinical Substantiation/medical necessity for the above recommendations:     Medical necessity criteria is warranted in order to: Measure a psychological disorder and its severity and functional impairment to determine psychiatric diagnosis when a mental illness is suspected, or to achieve a differential diagnosis from a range of medical/psychological disorders that present with similar constellations of symptoms (e.g., determination and measurement of anxiety severity and impact in the presence of ongoing asthma or heart disease), Perform symptom measurement to objectively measure treatment effectiveness and/or determine the need to refer for pharmacological treatment or other medical evaluation (e.g., based on severity and chronicity of symptoms), and Evaluate primary symptoms of impaired attention and concentration that can occur in many neurological  and psychiatric conditions.    Medical necessity for psychological assessment is warranted as a result of the following: (1) A specific clinical question is posed that relates to the condition/symptoms being addressed (2) The question cannot be adequately addressed by clinical interview and/or behavioral observation (3) Results of psychological testing are reasonably expected to provide an answer to the query (4) It is reasonably expected that the testing will provide information leading to a clearer diagnosis and/or guide treatment planning with an expectation of improved clinical outcome.    7. DAVIE:    DAVIE:  N/A.    8. Records:   These were reviewed at time of assessment.   Information in this assessment was obtained from the medical record and  provided by patient who is a good historian. Patient will have open access to their mental health medical record.    9. Interactive Complexity: No     Parts of this documentation may have been completed using dictation software. Potential errors may result and are unintentional.       Jeri Garza PsyD, LP  October 11, 2023

## 2023-10-25 ENCOUNTER — VIRTUAL VISIT (OUTPATIENT)
Dept: PSYCHOLOGY | Facility: CLINIC | Age: 33
End: 2023-10-25
Payer: COMMERCIAL

## 2023-10-25 DIAGNOSIS — F90.0 ATTENTION DEFICIT HYPERACTIVITY DISORDER, INATTENTIVE TYPE, MODERATE: Primary | ICD-10-CM

## 2023-10-25 PROCEDURE — 96130 PSYCL TST EVAL PHYS/QHP 1ST: CPT | Mod: 95 | Performed by: PSYCHOLOGIST

## 2023-10-25 PROCEDURE — 96131 PSYCL TST EVAL PHYS/QHP EA: CPT | Mod: 95 | Performed by: PSYCHOLOGIST

## 2023-10-25 NOTE — PROGRESS NOTES
[facility]   Mental Health & Addiction Services     Progress Note - ADHD Feedback Session     Patient Name: Mulugeta Valentin  Date: 2023       Service Type:  Individual       Session Start Time: 4:00pm  Session End Time:  4:18pm     Session Length: 18 minutes    Session #: 3    Attendees: Patient attended alone    Service Modality: Video Visit:      Provider verified identity through the following two step process.  Patient provided:  Patient  and Patient address    Telemedicine Visit: The patient's condition can be safely assessed and treated via synchronous audio and visual telemedicine encounter.      Reason for Telemedicine Visit: Services only offered telehealth    Originating Site (Patient Location): Patient's home    Distant Site (Provider Location): Provider Remote Setting- Home Office    Consent:  The patient/guardian has verbally consented to: the potential risks and benefits of telemedicine (video visit) versus in person care; bill my insurance or make self-payment for services provided; and responsibility for payment of non-covered services.     Patient would like the video invitation sent by:  Send to e-mail at: [email or web address]    Mode of Communication:  [facility]    Distant Location (Provider):  Off-site    As the provider I attest to compliance with applicable laws and regulations related to telemedicine.          2020    12:00 PM 2022    10:09 AM 2023     1:41 PM   PHQ   PHQ-9 Total Score 12 17 4   Q9: Thoughts of better off dead/self-harm past 2 weeks Not at all Not at all Not at all           2020    12:00 PM 2022    10:09 AM 2023     1:41 PM   ANANDA-7 SCORE   Total Score  9 (mild anxiety)    Total Score 6 9 1         DATA      Progress Since Last Session (Related to Symptoms / Goals / Homework):   Symptoms: Stable.    Homework: Completed.      Treatment Objective(s)  Addressed in This Session:   Provided feedback on ADHD evaluation. Reviewed test results in depth. Plan of care and recommendations were discussed based on testing data. See full report attached on secondary note in this encounter.     Intervention:   Provided feedback to patient regarding testing results, diagnoses, and treatment recommendations. Test results are consistent with an ADHD diagnosis. Symptoms are not better explained by another mental health disorder. Personalized suggestions regarding symptoms were offered. Patient had the opportunity to ask questions; he expressed understanding.        ASSESSMENT: Current Emotional / Mental Status (status of significant symptoms):   Risk status (Self / Other harm or suicidal ideation)   Patient denies current fears or concerns for personal safety.   Patient denies current or recent suicidal ideation or behaviors.   Patient denies current or recent homicidal ideation or behaviors.   Patient denies current or recent self injurious behavior or ideation.   Patient denies other safety concerns.   Patient reports there has been no change in risk factors since their last session.     Patient reports there has been no change in protective factors since their last session.     Recommended that patient call 911 or go to the local ED should there be a change in any of these risk factors.     Appearance:   Appropriate    Eye Contact:   Good    Psychomotor Behavior: Normal    Attitude:   Cooperative    Orientation:   All   Speech    Rate / Production: Normal     Volume:  Normal    Mood:    Normal   Affect:    Appropriate    Thought Content:  Clear    Thought Form:  Coherent  Logical    Insight:    Good      Medication Review:   No changes to current psychiatric medication(s)     Medication Compliance:   Yes     Changes in Health Issues:   None reported     Chemical Use Review:   Substance Use: Chemical use reviewed, no active concerns identified      Nicotine Use: No current  tobacco use.      Diagnosis:  1. Attention deficit hyperactivity disorder, inattentive type, moderate        PLAN:   Recommendations are outlined in full evaluation report (attached to this encounter).   Patient indicated understanding and will contact the clinic if there are further questions.    Report routed to referring provider.    Parts of this documentation may have been completed using dictation software. Potential errors may result and are unintentional.       Jeri Garza PsyD, LP  Clinical Psychologist         Psychological Testing Services Summary       Testing Evaluation Services Base: 92709  (first 60 mins) Add-on: 22599  (each addtl 60 mins)   Record Review and Clarify Referral Question   2:50pm-3:00pm on 9/26/2023 10 minutes   Clinical Decision Making/Battery Modification   3:45pm-4:00pm on 10/11/2023 15 minutes   Integration/Report Generation   12:00pm-1:00pm on 10/24/2023 ([place])  1:00pm-2:00pm on 10/24/2023 (Final Report)   60 minutes  60 minutes   Interactive Feedback Session   4:00pm-4:18pm on 10/25/2023 18 minutes   Post-Service Work   4:18pm-4:33pm on 10/25/2023 15 minutes   Total Time: 178 minutes   Total Units: 1 2       Diagnoses:   F90.0 Attention-Deficit/Hyperactivity Disorder, inattentive presentation, moderate

## 2023-10-25 NOTE — Clinical Note
Hello,  I wanted to let you know that I have completed the ADHD assessment with this patient.  As you will see the report, data do support an ADHD diagnosis.  I encouraged him to make an appointment with you soon to discuss medication options.  Please let me know if you have any questions or concerns!  Thanks!   Jeri

## 2023-10-25 NOTE — PROGRESS NOTES
"    Psychological Assessment Report    Patient: Mulugeta Valentin (Dan)  YOB: 1990  MRN: [id number]  Date(s) of assessment: 9/26/2023 and 10/11/2023  Referral Source: [facility]  Reason for Referral: assessing reported deficits in executive functioning     IDENTIFYING INFORMATION AND BRIEF HISTORY OF PRESENTING PROBLEM: Madi Valentin is a 33-year-old White man who presented to the initial diagnostic intake appointments on 9/26/2023 and 10/11/2023 (see diagnostic intake dated 10/11/2023 for more detailed background information) due to primary concerns with continued difficulties with inattention.  The patient reported that he was diagnosed with ADHD in the past and prescribed stimulant medications through college.  The patient reported that when he withdrew from college, he discontinued his prescription.  Now that he owns his own home and is working in a career that he enjoys, problems have been negatively impacting him.    As a child, the patient reported that his teachers would during conferences that he was not paying attention.  He described himself as a \"space case.\"  Further reported that his difficulties with talking excessively during class were also mentioned to his parents and conferences.  He went on to describe problems with organization, as he was messy and had difficulties with losing items.  Completing homework without procrastinating became more of a problem in middle school and beyond.  As a result of problems, he completed an evaluation in the summer after graduating high school.  He reported that he wanted to assess difficulties prior to beginning college.  The patient did attend college at a community college and later transferred to the [employer].  He reported that he was also working at the time and did not see value in continuing because he realized that he was training and new hires for the job that he was already doing " without a college degree.  Currently works full-time as a pharmacy technician on an inpatient unit.  Reported some problems with managing tasks in this environment.  Further, the patient indicated that now that he owns his own home, completing tasks and projects have been very difficult.  For example, he stated that he hates the colors of the walls, but has been unable to focus on buying paint materials and planning when/how to paint. Currently, the patient reported the following symptoms: Difficulty making careless mistakes/not paying attention to details (harmless mistakes with dispense prep at work), difficulty sustaining attention, difficulty following through with tasks, difficulty organizing himself, losing things often, is easily distracted, is forgetful, and often interrupts others.  The patient is seeking diagnostic clarification and updated treatment recommendations    Mental Health History: The patient reported a history of depression symptoms that began in 2018.  He began taking medications to manage symptoms in 2022.  Continues taking his prescribed Wellbutrin and Zoloft and symptoms are in remission.  The patient denied a history of manic symptoms, anxiety symptoms, social anxiety, phobic responses, symptoms of obsessive-compulsive disorder, trauma, and perceptual difficulties. The patient denied issues with sexual compulsivity, gambling, and disordered eating.    Developmental History: The patient reported that he believes that he is the product of a full-term pregnancy and there were no complications during his mother's pregnancy or birth. The patient reported that he believes he met all of his developmental milestones on time.  He denied a history of head injuries, learning disorders, and special educational programming.  The patient reported that he grew up with his mother, father, and older brother in the home.  Parents remained  until his father passed away 5 years ago.  Stated that he got  along well with family members and participated in extracurriculars throughout childhood.    Chemical Dependence/Substance Abuse History: The patient denied a history of chemical or substance abuse.     SOURCES OF DATA/ASSESSMENT: Review of medical and psychiatric records, consideration of behavioral observations during the testing (if applicable), and the results of the psychological tests are all considered in the preparation of this psychological test report. It is important to note that test results comprise a hypothesis of the patient's mental health concerns and are not an independent or conclusive assessment. Test results are combined with the patient's available medical, psychological, behavioral data for an integrated interpretation and report. Due to virtual/remote administration, certain aspects of the assessment process were impacted, such as access to direct patient observation, and maintaining an environment conducive to testing. As such, external factors have the potential to affect the validity of data collected.    TESTS ADMINISTERED:  Mukesh Adult ADHD Rating Scale-IV: Self and Other Reports (BAARS-IV)  Mukehs Functional Impairment Scale: Self and Other Reports (BFIS)  Mukesh Deficits in Executive Functioning Scale (BDEFS)  Generalized Anxiety Disorder Questionnaire (ANANDA-7)  Patient Health Questionnaire- 9 (PHQ-9)    BEHAVIORAL OBSERVATIONS: The patient was pleasant and cooperative throughout all interview and explanation of testing process. The patient was oriented to person, place, and time. Mood was neutral. Eye contact was adequate and speech was at normal rate and rhythm. Motor activity was appropriate. Due to virtual/remote administration, direct patient observation was not possible during the testing process, and it is unknown if the patient was able to maintain an environment conducive to testing. As such, external factors have the potential to affect the validity of data collected.    "  TEST RESULTS: Test results comprise a hypothesis of the patient's mental health concerns and are not an independent or conclusive assessment. Test results are combined with the patient's available medical, psychological, behavioral, and observational data for an integrated interpretation and report.    Mukesh Adult ADHD Rating Scale-IV: Self and Other Reports (BAARS-IV)  The BAARS-IV assesses for symptoms of ADHD that are experienced in one's daily life. This assessment measure includes self and collateral rating scales designed to provide information regarding current and childhood symptoms of ADHD including inattention, hyperactivity, and impulsivity. Self-report scores are reported as percentiles. Scores at the 76th-83rd percentile are considered marginal, scores at the 84th-92nd percentile are considered borderline, scores at the 93rd-95th percentile are considered mild, scores at the 96th-98th percentile are considered moderate, and those at the 99th percentile are considered severe. Collateral or \"other\" rating scales are reported as number of symptoms observed in comparison to those reported by the client. Norms and percentile scores are not available for collateral reports.     Current Symptoms Scale--Self Report:   Client completed the self-report inventory of current symptoms. The results indicate that the client's Total ADHD Score was 40 which places the patient in the 93rd percentile for overall ADHD symptoms. In addition, the client endorsed 8/9 (98th percentile) Inattention symptoms, 0/9 (1-75th percentile) Hyperactivity-Impulsivity symptoms, and 5/9 (94th percentile) Sluggish Cognitive Tempo symptoms. Client indicated that the reported symptoms have resulted in impaired functioning in school, home, work, and social relationships. Overall, the results suggest the client is experiencing mild ADHD symptoms.     Current Symptoms Scale--Other Report:  Client's mother completed the collateral report " inventory of current symptoms. Based on the collateral contact's observation of symptoms, the client demonstrates 2/9 Inattention symptoms, 0/5 Hyperactivity symptoms, 0/4 Impulsivity symptoms, and 2/9 Sluggish Cognitive Tempo symptoms. The client's Total ADHD Score was 30. The collateral contact indicated the client demonstrates impaired functioning in school.  The collateral- and self-report scores are significantly different.    Childhood Symptoms Scale--Self-Report:  Client completed the self-report inventory of childhood symptoms. The results indicate that the client's Total ADHD Score was 39 which places the patient in the 87th percentile for overall ADHD symptoms in childhood. In addition, the client endorsed 4/9 (89th percentile) Inattention symptoms and 1/9 (78th percentile) Hyperactivity-Impulsivity symptoms. Client indicated that the reported symptoms resulted in impaired functioning in school, home, and social relationships. Overall, the results suggest the client experienced borderline symptoms of ADHD as a child.     Childhood Symptoms Scale--Other Report:  Client's mother completed the collateral report inventory of childhood symptoms. Based on the collateral contact's recollection of client's childhood symptoms, the client demonstrated 6/9 Inattention symptoms and 1/9 Hyperactivity-Impulsivity symptoms. The client's Total ADHD Score was 37. The collateral contact indicated the client demonstrates impaired functioning in school.  The collateral- and self-report scores are that significantly different.                           Mukesh Functional Impairment Scale: Self and Other Reports (BFIS)  The BFIS is used to assess an individuals' psychosocial impairment in major life/daily activities that may be due to a mental health disorder. This assessment measure includes self and collateral rating scales. Self-report scores are reported as percentiles. Scores at the 76th-83rd percentile are considered  "marginal, scores at the 84th-92nd percentile are considered borderline, scores at the 93rd-95th percentile are considered mild, scores at the 96th-98th percentile are considered moderate, and those at the 99th percentile are considered severe. Collateral or \"other\" rating scales are reported as number of symptoms observed in comparison to those reported by the client. Norms and percentile scores are not available for collateral reports.     Results indicate the client identified impairment (scores at or greater than 93rd percentile) in the following areas: home-chores, daily responsibilities, self-care routines, and health maintenance.  The client's Mean Impairment Score was 4.75 (86th percentile) indicating the client is reporting 33% impairment in functioning across domains. Client's mother completed the collateral rating scale, which indicated similar results.       Mukesh Deficits in Executive Functioning Scale (BDEFS)  The BDEFS is a measure used for evaluating dimensions of adult executive functioning in daily life. This assessment measure includes self and collateral rating scales. Self-report scores are reported as percentiles. Scores at the 76th-83rd percentile are considered marginal, scores at the 84th-92nd percentile are considered borderline, scores at the 93rd-95th percentile are considered mild, scores at the 96th-98th percentile are considered moderate, and those at the 99th percentile are considered severe. Collateral or \"other\" rating scales are reported as number of symptoms observed in comparison to those reported by the client. Norms and percentile scores are not available for collateral reports.     Results indicate the client's Total Executive Functioning Score was 219 (96th percentile). The ADHD-Executive Functioning Index score was 27 (94th percentile). These scores suggest the client has mild deficits in executive functioning. Results indicate the client identified significant deficits in " the following areas: self-management to time (moderate deficits), self-organization/problem-solving (moderate deficits), self-restraint (marginal deficits), self-motivation (moderate deficits) and self-regulation of emotions (borderline deficits). Client's mother completed the collateral rating scale, which indicated similar results. The collateral contact's scores were generally lower than the client's report.    Generalized Anxiety Disorder Questionnaire (ANANDA-7)  This questionnaire is designed to assess for anxiety in adults. Based on the score, the patient is experiencing no significant symptoms of anxiety. Client identified the following symptoms of anxiety: feeling on edge/nervous/anxious, worrying about many different things, trouble relaxing, and becoming easily annoyed or irritable.    Patient Health Questionnaire- 9 (PHQ-9)   This questionnaire is designed to assess for depression in adults. Based on the score, the patient is experiencing mild symptoms of depression. Client identified the following symptoms of depression: depressed mood, lack of interest, difficulty with sleep, and poor concentration.    SUMMARY: Madi Valentin is a 33-year-old White man who completed psychological testing remotely/virtually due to the COVID-19 pandemic. Testing was requested to provide updated diagnostic clarification and necessary treatment recommendations.    Patient first completed a diagnostic interview in which mental health symptoms, ADHD symptoms, and background information was gathered. Patient self-reported seven symptoms of inattention, one symptom of hyperactivity, and indicated that his abilities to function effectively at home and work are significantly impaired. Further, his self-reported symptoms on Mukesh measures of ADHD symptoms were consistent with this information.  Both the patient and his mother recalled a history of significant symptoms in childhood.  Given that the patient has previously been  diagnosed with ADHD, current problems can continue to be conceptualized as having a neurodevelopmental basis.  This is particularly the case given lack of mental health symptoms.    Referral Question Response: DSM-5 criteria for ADHD:   A. Symptom Count - Are there sufficient symptoms for the diagnosis? Yes, patient did endorse sufficient inattention symptoms.   B. Onset - Were several symptoms present before 11 years of age? Yes, a significant number of symptoms reportedly began in childhood.  The patient has been diagnosed with ADHD in the past.   C. Pervasiveness - Are several symptoms present in at least two settings? Yes, patient reported that symptoms are problematic at home and work.   D. Impairment - Do symptoms interfere with or reduce the quality of functioning? Yes, patient is unable to complete daily tasks effectively.   E. Exclusions - Are symptoms better explained by another disorder or factor?  No, symptoms are not better explained by depression symptoms, which are in remission. Difficulties are explained by an organic basis of inattention.     DIAGNOSES:  F90.0 Attention-Deficit/Hyperactivity Disorder, inattentive presentation, moderate    PLAN OF CARE:  Discuss the following with your primary care provider:  Consider a trial of a stimulant medication. This may help alleviate some of the patient's attentional symptoms.     Consider initiating individual psychotherapy to help alleviate mood symptoms. Research indicates that outcomes are best with both medication and therapy. You can call the [facility] [facility] line at [phone]. ALISHA Orozco is recommended specifically because she works with individuals with ADHD.    RECOMMENDATIONS:  Due to the patient's reported attention, concentration, and mood difficulties, the following health/lifestyle changes when combined, can significantly improve symptoms:   Avoid simple carbohydrates at breakfast. Aim for only complex  carbohydrates and lean protein for your morning meal.   Engage in aerobic exercise 3 times per week for 30 minutes, ensuring that your heart rate stays within your training zone. Further, reading the book,  Spark,  by Nikita Pretty M.D can help the patient understand the benefits of exercise on the brain.   Research suggest that taking a high-quality multi-vitamin and antioxidant (1/2 cup of blueberries) daily in conjunction with balanced nutrition can be helpful.  Aim for the high end of daily water intake: around 72 ounces per day.  Ensure regular meals and snacks to maintain optimal attention.    The following may be beneficial in managing some of the patient's attention and concentration difficulties:  Due to the patient's difficulties with attention and concentration, consider working in a completely distraction-free area while completing tasks. Workspaces should be completely clear except for the materials needed for the current task. Both visual and auditory distractions should be decreased as much as possible.  Considering decreased ability to focus and maintain attention, it is recommended that the patient take frequent breaks while completing tasks. This will help to maintain attention and effort. The patient may benefit from the use of a [employer] Timer. The timer works by using built-in break times. After working on a task consistently for 25 minutes, the timer reminds the user to take a five-minute break before continuing, etc. A [employer] timer can be downloaded as a free ghanshyam to a phone or tablet.  Due to the patient's attentional and concentration symptoms, it is recommended to increase organization with the use of lists and calendars. Significantly increasing structure to the day and adhering to a set schedule can increase your ability to complete responsibilities, track deadline, etc. Breaking these tasks down into their component parts and recording them in a calendar/planner will likely be beneficial.  Patient would benefit from setting feasible timelines for completion of activities. By establishing clear priorities for completing tasks, you can more likely complete the most important tasks first. The patient may also choose to elect to a friend or family member to help hold them accountable.    Avoid multitasking. Attempting to work on multiple tasks and projects the same increases the likelihood that an error will occur. Focus on one task at a time.    The patient may benefit from engaging in mindfulness practices. This may include breathing techniques, apps that provide guided meditation, or more interactive activities such as coloring.    See the attached tip sheet for more ideas as well as online and book resources.       Jeri Garza PsyD, LP  Clinical Psychologist

## 2023-10-25 NOTE — PATIENT INSTRUCTIONS
Coping with Attention-Deficit/Hyperactivity Disorder (ADHD)    If you have ADHD, it can be hard to sit still, pay attention or control impulsive behavior. ADHD can cause problems at home, at school, at work and in social settings. But you can learn ways to control your symptoms. Below are some ideas for coping with the most common ADHD problems.     Memory, Focus, and Managing Time  Be mindful of time. Use a watch, phone, timer, alarm, PDA or computer--anything that keeps accurate time that you can see and hear at all times. Set more than one alarm to remind you how much time you have left for a task. Give yourself more time than you think you need. For important appointments or deadlines, set reminder alarms hours or days ahead of time.   Use a day planner. A day planner can help you manage time and remember responsibilities. Learning to use a planner is just like learning to use any tool--practice makes perfect.   Use lists. Lists and notes can help you keep track of regular tasks, projects, deadlines and appointments. If you decide to use a daily planner, keep all lists and notes inside of it. Use color codes for tasks so you know which ones are the most important.  Learn to say no and set boundaries. Do not take on too many projects or social engagements. Overbooking yourself can be overwhelming and can lead to missed commitments.  Repeat out loud the instructions you have been given. This will help you remember, as well as let others correct you if needed.    Organization  Create space. Ask yourself what you need on a daily basis. Then, find storage bins or closets for things you don t need every day. Have specific areas for things like keys, bills and other items that are easy to misplace. Throw away or donate things you don t need.   Deal with it now. You can avoid forgetfulness, clutter and procrastination by doing things right away, not some time in the future. This includes filing papers, cleaning up  messes, opening and responding to mail and returning phone calls.  Set up a filing system. Use dividers or separate file folders for different types of documents, such as medical records, receipts and pay stubs. Label and color-code your files so that you can quickly find what you need.   Break larger tasks into small, manageable pieces. Write down the steps needed to complete the task. Follow each step in order until the task is done. If needed, take small, timed breaks and return to finish the task.  Organize your work space. Use lists or planners to organize your day. Remove clutter from your desk. Label any storage bins. Reduce distraction as much as possible. Ideas include sitting facing the wall, closing your door or using a white noise machine.    Sitting Still  Move around as needed. Don t fight the urge to move around. If you need to fidget or stand up for a period of time to help maintain attention, then do so. But take care that you re not interrupting others. You may also find it helpful to squeeze a stress ball.  Be active in a useful way. Exercise can burn off extra energy, relieve stress, boost your mood and calm your mind. Meditation, yoga or landry chi can help you better control your attention and impulses.  Stay healthy. Get enough sleep. Avoid caffeine late in the day. Exercise. Create a relaxing bedtime routine. Stick to a schedule or daily routine. Eat small meals throughout the day. Avoid sugar, eat fewer carbohydrates and eat more protein.     Close Relationships  Talk to your loved ones about ADHD. There are many resources available that can help your loved one understand ADHD. See the Resources section below.  Divide daily tasks based on each person s strengths. For example, if you have trouble with organization, you may not want to do financial planning tasks.  If you have trouble with focus, develop a sign that others in your life can use as a gentle reminder to pay attention. The sign  should be small but meaningful to you and the other person.  Improve communication. Use a dry erase board in a common area to help the whole family stay in touch better. Keep regular to-do lists and compare scheduled activities every day. Writing notes to each other is also very helpful.  Be an active listener and try not to interrupt. If you find your mind wandering when others are talking, mentally repeat their words so you can follow the conversation. Ask questions and ask people to repeat what they said if needed. Pay close attention to body language.   Organize your thoughts. If you need to have a serious conversation, write a list of the points you would like to make or the important ideas you want to talk about. This can help you stay focused and remember what you need to say.  Think before speaking. It can be hard to control your impulses when you feel strongly about something. Before saying whatever pops into your head, stop to ask yourself  Will this be helpful?  or  Will this help me get what I want?   Take charge of your life. Work to accept that some things are harder for you. Make a plan to address these troubles and seek the support you need.    Online Resources  ADD Warehouse. http://www.Appforma.com  ADHD and You.  Tips for Adults with ADHD.  http://www.adhdandyou.com/adhd-patient/adhd-tips-young-adult/  Attention Deficit Disorder Association. http://www.add.org  Attention Deficit Disorder Resources. http://www.addresources.org  Children and Adults with Attention-Deficit/Hyperactivity Disorder (NADINE). http://www.nadine.org/  Jessica Ji.  33 Ways to Get Organized with Adult ADHD.  http://www.additudemag.com/adhd/article/729.html  National Resource Center on ADHD. http://www.xtos0oirw.org/  Gracie De La Cruz.  Daily Living Tips for Adult ADHD.  http://www.medicinenet.com/living_tips_adult_adhd_pictures_slideshow/article.htm  Darrion Ramirez and Laura Rose.  Help for Adult ADD / ADHD.   http://www.helpguide.org/mental/adhd_add_adult_strategies.htm  You can also find many apps for your phone that can help you with common ADHD struggles    Book Resources  Eren Mayorga. ADHD in Adults. (2008).  Eren Mayorga. Taking Charge of Adult ADHD. (2010).  Jonathan Pretty. Delivered from Distraction. (2006).  Jonathan Pretty. Driven to Distraction. (2011).  Sugar Landin. ADD in the Workplace. (1997).  Sugar Landin. ADD-Friendly Ways to Organize Your Life. (2002).  Hamida Anthony. The ADHD Effect on Marriage: Understand and Rebuild Your Relationship in Six Steps. (2010).  Ayanna Moya and Letty Remy. You Mean I m Not Lazy, Stupid or Crazy? (2006).  Ishaan Bruce. Understand Your Brain, Get More Done: The ADHD Executive Functions Workbook. (2012).    Support Groups  ADHD Adult Support Group  [address].  7:00 to 8:30 p.m., last Thursday of each month (except December).  Contact/RSVP: [email or web address]    ADHD Adult Support Group  [address].  Thursday 10:00 a.m. to 12:00 p.m. or 7:00 to 9:30 p.m.  Contact/RSVP: Will Reinoso. [phone] or [email or web address]     ADHD Adult Support Group for Spouses/Partners of adults with ADHD  [address].  Tuesday 12:00 to 2:00 p.m.   Contact/RSVP: Will Reinoso. [phone] or [email or web address]

## 2023-11-06 ASSESSMENT — ANXIETY QUESTIONNAIRES
2. NOT BEING ABLE TO STOP OR CONTROL WORRYING: NOT AT ALL
6. BECOMING EASILY ANNOYED OR IRRITABLE: SEVERAL DAYS
GAD7 TOTAL SCORE: 2
IF YOU CHECKED OFF ANY PROBLEMS ON THIS QUESTIONNAIRE, HOW DIFFICULT HAVE THESE PROBLEMS MADE IT FOR YOU TO DO YOUR WORK, TAKE CARE OF THINGS AT HOME, OR GET ALONG WITH OTHER PEOPLE: NOT DIFFICULT AT ALL
5. BEING SO RESTLESS THAT IT IS HARD TO SIT STILL: NOT AT ALL
1. FEELING NERVOUS, ANXIOUS, OR ON EDGE: NOT AT ALL
3. WORRYING TOO MUCH ABOUT DIFFERENT THINGS: SEVERAL DAYS
GAD7 TOTAL SCORE: 2
7. FEELING AFRAID AS IF SOMETHING AWFUL MIGHT HAPPEN: NOT AT ALL
4. TROUBLE RELAXING: NOT AT ALL

## 2023-11-13 ENCOUNTER — VIRTUAL VISIT (OUTPATIENT)
Dept: FAMILY MEDICINE | Facility: CLINIC | Age: 33
End: 2023-11-13
Payer: COMMERCIAL

## 2023-11-13 DIAGNOSIS — F33.41 RECURRENT MAJOR DEPRESSIVE DISORDER, IN PARTIAL REMISSION (H): ICD-10-CM

## 2023-11-13 DIAGNOSIS — F98.8 ATTENTION DEFICIT DISORDER (ADD) WITHOUT HYPERACTIVITY: Primary | ICD-10-CM

## 2023-11-13 PROCEDURE — 99213 OFFICE O/P EST LOW 20 MIN: CPT | Mod: VID | Performed by: FAMILY MEDICINE

## 2023-11-13 RX ORDER — BUPROPION HYDROCHLORIDE 300 MG/1
300 TABLET ORAL DAILY
Qty: 90 TABLET | Refills: 1 | Status: SHIPPED | OUTPATIENT
Start: 2023-11-13 | End: 2024-08-05

## 2023-11-13 RX ORDER — BUPROPION HYDROCHLORIDE 150 MG/1
150 TABLET ORAL EVERY MORNING
Qty: 90 TABLET | Refills: 1 | Status: CANCELLED | OUTPATIENT
Start: 2023-11-13

## 2023-11-13 NOTE — PROGRESS NOTES
"Madi is a 33 year old who is being evaluated via a billable video visit.      Converted to phone visit- having trouble on video hearing patient.          Assessment & Plan     Recurrent major depressive disorder, in partial remission (H24)  Patient feels like he like to titrate down on his Wellbutrin and feels current dose is may be too much for him  Discussed stopping 150 mg and continue with the 300  Continue with sertraline    Attention deficit disorder (ADD) without hyperactivity  Patient was seen by psychology  Reviewed records indicating ADD  Patient had in years ago been on Adderall  Discussed with the nurse blood pressure check is needed prior to starting medication if blood pressure is normal start extended release 15 mg         BMI:   Estimated body mass index is 26.21 kg/m  as calculated from the following:    Height as of 12/23/22: 1.676 m (5' 6\").    Weight as of 12/23/22: 73.7 kg (162 lb 6 oz).       Moises Orozco MD  [facility]    Subjective   Madi is a 33 year old, presenting for the following health issues:  Recheck Medication (Talk about ADHD medication, Wellbutrin and Zolof)        11/13/2023     5:08 PM   Additional Questions   Roomed by jerry rios lpn   Accompanied by -   Patient is here to discuss follow-up from psychology appointment where he is diagnosed with ADD  He would like to start back on medication like he was on years ago  Previously had been on 60 mg extended release and felt that was having side effects of sleeping problems at night.  Last blood pressure in clinic was elevated discussed we need to have normalized blood pressure prior to starting these medications.  He will make a nurse visit.  He also would like to cut down on bupropion feels current dosing is more than he needs.  We will have him discontinue the 150 and continue with the 300 and continue with sertraline.    History of Present Illness       Mental Health Follow-up:  Patient presents " to follow-up on Depression & Anxiety.Patient's depression since last visit has been:  Good  The patient is not having other symptoms associated with depression.  Patient's anxiety since last visit has been:  Good  The patient is not having other symptoms associated with anxiety.  Any significant life events: No  Patient is not feeling anxious or having panic attacks.  Patient has no concerns about alcohol or drug use.    Reason for visit:  Medication discussion, ADHD diagnosis    He eats 2-3 servings of fruits and vegetables daily.He consumes 1 sweetened beverage(s) daily.He exercises with enough effort to increase his heart rate 20 to 29 minutes per day.  He exercises with enough effort to increase his heart rate 4 days per week.   He is taking medications regularly.               Review of Systems         Objective    Vitals - Patient Reported  Pain Score: No Pain (0)        Physical Exam   GENERAL: Healthy, alert and no distress  EYES: Eyes grossly normal to inspection.  No discharge or erythema, or obvious scleral/conjunctival abnormalities.  RESP: No audible wheeze, cough, or visible cyanosis.  No visible retractions or increased work of breathing.    SKIN: Visible skin clear. No significant rash, abnormal pigmentation or lesions.  NEURO: Cranial nerves grossly intact.  Mentation and speech appropriate for age.  PSYCH: Mentation appears normal, affect normal/bright, judgement and insight intact, normal speech and appearance well-groomed.            Video-Visit Details    Type of service:  Video Visit     Originating Location (pt. Location): Home    Distant Location (provider location):  On-site  Platform used for Video Visit: Telephone

## 2023-11-16 ENCOUNTER — ALLIED HEALTH/NURSE VISIT (OUTPATIENT)
Dept: FAMILY MEDICINE | Facility: CLINIC | Age: 33
End: 2023-11-16
Payer: COMMERCIAL

## 2023-11-16 VITALS — DIASTOLIC BLOOD PRESSURE: 72 MMHG | SYSTOLIC BLOOD PRESSURE: 140 MMHG

## 2023-11-16 DIAGNOSIS — Z01.30 BLOOD PRESSURE CHECK: Primary | ICD-10-CM

## 2023-11-16 PROCEDURE — 99207 PR NO CHARGE NURSE ONLY: CPT

## 2023-12-14 DIAGNOSIS — I10 ESSENTIAL HYPERTENSION: Primary | ICD-10-CM

## 2023-12-14 RX ORDER — LISINOPRIL 10 MG/1
10 TABLET ORAL DAILY
Qty: 90 TABLET | Refills: 1 | Status: SHIPPED | OUTPATIENT
Start: 2023-12-14 | End: 2024-05-13

## 2024-01-10 DIAGNOSIS — F98.8 ATTENTION DEFICIT DISORDER (ADD) WITHOUT HYPERACTIVITY: Primary | ICD-10-CM

## 2024-01-10 RX ORDER — DEXTROAMPHETAMINE SACCHARATE, AMPHETAMINE ASPARTATE MONOHYDRATE, DEXTROAMPHETAMINE SULFATE AND AMPHETAMINE SULFATE 5; 5; 5; 5 MG/1; MG/1; MG/1; MG/1
20 CAPSULE, EXTENDED RELEASE ORAL DAILY
Qty: 30 CAPSULE | Refills: 0 | Status: SHIPPED | OUTPATIENT
Start: 2024-01-10 | End: 2024-02-05

## 2024-02-05 DIAGNOSIS — F98.8 ATTENTION DEFICIT DISORDER (ADD) WITHOUT HYPERACTIVITY: Primary | ICD-10-CM

## 2024-02-05 RX ORDER — DEXTROAMPHETAMINE SACCHARATE, AMPHETAMINE ASPARTATE MONOHYDRATE, DEXTROAMPHETAMINE SULFATE AND AMPHETAMINE SULFATE 6.25; 6.25; 6.25; 6.25 MG/1; MG/1; MG/1; MG/1
25 CAPSULE, EXTENDED RELEASE ORAL EVERY MORNING
Qty: 30 CAPSULE | Refills: 0 | Status: SHIPPED | OUTPATIENT
Start: 2024-02-05 | End: 2024-03-05

## 2024-03-05 DIAGNOSIS — F98.8 ATTENTION DEFICIT DISORDER (ADD) WITHOUT HYPERACTIVITY: ICD-10-CM

## 2024-03-05 RX ORDER — DEXTROAMPHETAMINE SACCHARATE, AMPHETAMINE ASPARTATE MONOHYDRATE, DEXTROAMPHETAMINE SULFATE AND AMPHETAMINE SULFATE 7.5; 7.5; 7.5; 7.5 MG/1; MG/1; MG/1; MG/1
30 CAPSULE, EXTENDED RELEASE ORAL EVERY MORNING
Qty: 30 CAPSULE | Refills: 0 | Status: SHIPPED | OUTPATIENT
Start: 2024-03-05 | End: 2024-04-07

## 2024-04-07 ENCOUNTER — MYC REFILL (OUTPATIENT)
Dept: FAMILY MEDICINE | Facility: CLINIC | Age: 34
End: 2024-04-07
Payer: COMMERCIAL

## 2024-04-07 DIAGNOSIS — F98.8 ATTENTION DEFICIT DISORDER (ADD) WITHOUT HYPERACTIVITY: ICD-10-CM

## 2024-04-08 RX ORDER — DEXTROAMPHETAMINE SACCHARATE, AMPHETAMINE ASPARTATE MONOHYDRATE, DEXTROAMPHETAMINE SULFATE AND AMPHETAMINE SULFATE 7.5; 7.5; 7.5; 7.5 MG/1; MG/1; MG/1; MG/1
30 CAPSULE, EXTENDED RELEASE ORAL EVERY MORNING
Qty: 30 CAPSULE | Refills: 0 | Status: SHIPPED | OUTPATIENT
Start: 2024-04-08 | End: 2024-05-07

## 2024-05-06 NOTE — PROGRESS NOTES
PRIMARY DIAGNOSIS: ACUTE PAIN  OUTPATIENT/OBSERVATION GOALS TO BE MET BEFORE DISCHARGE:  1. Pain Status: Not met.  IV dilaudid given prn  and ice to abdomen for pain relief.      2. Return to near baseline physical activity: Met.  Up IND.      3. Cleared for discharge by consultants (if involved):Not met.  Surgery following.     Surgery planned for @0920 today.   
PRIMARY DIAGNOSIS: ACUTE PAIN  OUTPATIENT/OBSERVATION GOALS TO BE MET BEFORE DISCHARGE:  1. Pain Status: pain reduced    2. Return to near baseline physical activity: Met    3. Cleared for discharge by consultants (if involved):Not met    Discharge Planner Nurse   Safe discharge environment identified: Not met    
Pre-op report provided to pre-op RN.  Will send belongings with the patient at time of transfer to pre-op.   
RECEIVING UNIT ED HANDOFF REVIEW    ED Nurse Handoff Report was reviewed by: Marcio Blake RN on May 6, 2021 at 11:30 PM       
Handoff

## 2024-05-07 ENCOUNTER — MYC REFILL (OUTPATIENT)
Dept: FAMILY MEDICINE | Facility: CLINIC | Age: 34
End: 2024-05-07
Payer: COMMERCIAL

## 2024-05-07 DIAGNOSIS — F98.8 ATTENTION DEFICIT DISORDER (ADD) WITHOUT HYPERACTIVITY: ICD-10-CM

## 2024-05-07 RX ORDER — DEXTROAMPHETAMINE SACCHARATE, AMPHETAMINE ASPARTATE MONOHYDRATE, DEXTROAMPHETAMINE SULFATE AND AMPHETAMINE SULFATE 7.5; 7.5; 7.5; 7.5 MG/1; MG/1; MG/1; MG/1
30 CAPSULE, EXTENDED RELEASE ORAL EVERY MORNING
Qty: 30 CAPSULE | Refills: 0 | Status: SHIPPED | OUTPATIENT
Start: 2024-05-07 | End: 2024-06-10

## 2024-05-11 DIAGNOSIS — F34.1 DYSTHYMIA: Primary | ICD-10-CM

## 2024-05-11 DIAGNOSIS — I10 ESSENTIAL HYPERTENSION: ICD-10-CM

## 2024-05-13 RX ORDER — LISINOPRIL 10 MG/1
10 TABLET ORAL DAILY
Qty: 90 TABLET | Refills: 1 | Status: SHIPPED | OUTPATIENT
Start: 2024-05-13 | End: 2024-08-12

## 2024-06-10 ENCOUNTER — MYC REFILL (OUTPATIENT)
Dept: FAMILY MEDICINE | Facility: CLINIC | Age: 34
End: 2024-06-10
Payer: COMMERCIAL

## 2024-06-10 DIAGNOSIS — F98.8 ATTENTION DEFICIT DISORDER (ADD) WITHOUT HYPERACTIVITY: ICD-10-CM

## 2024-06-10 RX ORDER — DEXTROAMPHETAMINE SACCHARATE, AMPHETAMINE ASPARTATE MONOHYDRATE, DEXTROAMPHETAMINE SULFATE AND AMPHETAMINE SULFATE 7.5; 7.5; 7.5; 7.5 MG/1; MG/1; MG/1; MG/1
30 CAPSULE, EXTENDED RELEASE ORAL EVERY MORNING
Qty: 30 CAPSULE | Refills: 0 | Status: SHIPPED | OUTPATIENT
Start: 2024-06-10 | End: 2024-07-08

## 2024-07-08 ENCOUNTER — MYC REFILL (OUTPATIENT)
Dept: FAMILY MEDICINE | Facility: CLINIC | Age: 34
End: 2024-07-08
Payer: COMMERCIAL

## 2024-07-08 DIAGNOSIS — F98.8 ATTENTION DEFICIT DISORDER (ADD) WITHOUT HYPERACTIVITY: ICD-10-CM

## 2024-07-10 RX ORDER — DEXTROAMPHETAMINE SACCHARATE, AMPHETAMINE ASPARTATE MONOHYDRATE, DEXTROAMPHETAMINE SULFATE AND AMPHETAMINE SULFATE 7.5; 7.5; 7.5; 7.5 MG/1; MG/1; MG/1; MG/1
30 CAPSULE, EXTENDED RELEASE ORAL EVERY MORNING
Qty: 30 CAPSULE | Refills: 0 | Status: SHIPPED | OUTPATIENT
Start: 2024-07-10 | End: 2024-08-12

## 2024-08-04 ASSESSMENT — PATIENT HEALTH QUESTIONNAIRE - PHQ9
10. IF YOU CHECKED OFF ANY PROBLEMS, HOW DIFFICULT HAVE THESE PROBLEMS MADE IT FOR YOU TO DO YOUR WORK, TAKE CARE OF THINGS AT HOME, OR GET ALONG WITH OTHER PEOPLE: SOMEWHAT DIFFICULT
SUM OF ALL RESPONSES TO PHQ QUESTIONS 1-9: 2
SUM OF ALL RESPONSES TO PHQ QUESTIONS 1-9: 2

## 2024-08-04 ASSESSMENT — ANXIETY QUESTIONNAIRES
2. NOT BEING ABLE TO STOP OR CONTROL WORRYING: NOT AT ALL
4. TROUBLE RELAXING: NOT AT ALL
3. WORRYING TOO MUCH ABOUT DIFFERENT THINGS: SEVERAL DAYS
6. BECOMING EASILY ANNOYED OR IRRITABLE: SEVERAL DAYS
IF YOU CHECKED OFF ANY PROBLEMS ON THIS QUESTIONNAIRE, HOW DIFFICULT HAVE THESE PROBLEMS MADE IT FOR YOU TO DO YOUR WORK, TAKE CARE OF THINGS AT HOME, OR GET ALONG WITH OTHER PEOPLE: NOT DIFFICULT AT ALL
1. FEELING NERVOUS, ANXIOUS, OR ON EDGE: SEVERAL DAYS
GAD7 TOTAL SCORE: 3
7. FEELING AFRAID AS IF SOMETHING AWFUL MIGHT HAPPEN: NOT AT ALL
8. IF YOU CHECKED OFF ANY PROBLEMS, HOW DIFFICULT HAVE THESE MADE IT FOR YOU TO DO YOUR WORK, TAKE CARE OF THINGS AT HOME, OR GET ALONG WITH OTHER PEOPLE?: NOT DIFFICULT AT ALL
7. FEELING AFRAID AS IF SOMETHING AWFUL MIGHT HAPPEN: NOT AT ALL
GAD7 TOTAL SCORE: 3
5. BEING SO RESTLESS THAT IT IS HARD TO SIT STILL: NOT AT ALL
GAD7 TOTAL SCORE: 3

## 2024-08-05 ENCOUNTER — OFFICE VISIT (OUTPATIENT)
Dept: FAMILY MEDICINE | Facility: CLINIC | Age: 34
End: 2024-08-05
Payer: COMMERCIAL

## 2024-08-05 VITALS
OXYGEN SATURATION: 95 % | DIASTOLIC BLOOD PRESSURE: 96 MMHG | HEIGHT: 66 IN | BODY MASS INDEX: 27.8 KG/M2 | RESPIRATION RATE: 16 BRPM | TEMPERATURE: 98 F | SYSTOLIC BLOOD PRESSURE: 134 MMHG | WEIGHT: 173 LBS | HEART RATE: 87 BPM

## 2024-08-05 DIAGNOSIS — Z00.00 HEALTH CARE MAINTENANCE: Primary | ICD-10-CM

## 2024-08-05 DIAGNOSIS — F98.8 ATTENTION DEFICIT DISORDER (ADD) WITHOUT HYPERACTIVITY: ICD-10-CM

## 2024-08-05 DIAGNOSIS — I10 PRIMARY HYPERTENSION: ICD-10-CM

## 2024-08-05 LAB
AMPHETAMINES UR QL SCN: ABNORMAL
BARBITURATES UR QL SCN: ABNORMAL
BENZODIAZ UR QL SCN: ABNORMAL
BZE UR QL SCN: ABNORMAL
CANNABINOIDS UR QL SCN: ABNORMAL
FENTANYL UR QL: ABNORMAL
OPIATES UR QL SCN: ABNORMAL
PCP QUAL URINE (ROCHE): ABNORMAL

## 2024-08-05 PROCEDURE — 80307 DRUG TEST PRSMV CHEM ANLYZR: CPT | Performed by: FAMILY MEDICINE

## 2024-08-05 PROCEDURE — 99214 OFFICE O/P EST MOD 30 MIN: CPT | Performed by: FAMILY MEDICINE

## 2024-08-05 PROCEDURE — G2211 COMPLEX E/M VISIT ADD ON: HCPCS | Performed by: FAMILY MEDICINE

## 2024-08-05 NOTE — LETTER
[facility]  08/05/24  Patient: Mulugeta Valentin  YOB: 1990  Medical Record Number: [id number]                                                                                  Non-Opioid Controlled Substance Agreement    This is an agreement between you and your provider regarding safe and appropriate use of controlled substances prescribed by your care team. Controlled substances are?medicines that can cause physical and mental dependence (abuse).     There are strict laws about having and using these medicines. We here at [facility] are  committed to working with you in your efforts to get better. To support you in this work, we'll help you schedule regular office appointments for medicine refills. If we must cancel or change your appointment for any reason, we'll make sure you have enough medicine to last until your next appointment.     As a Provider, I will:   Listen carefully to your concerns while treating you with respect.   Recommend a treatment plan that I believe is in your best interest and may involve therapies other than medicine.    Talk with you often about the possible benefits and the risk of harm of any medicine that we prescribe for you.  Assess the safety of this medicine and check how well it works.    Provide a plan on how to taper (discontinue or go off) using this medicine if the decision is made to stop its use.      ::  As a Patient, I understand controlled substances:     Are prescribed by my care provider to help me function or work and manage my condition(s).?  Are strong medicines and can cause serious side effects.     Need to be taken exactly as prescribed.?Combining controlled substances with certain medicines or chemicals (such as illegal drugs, alcohol, sedatives, sleeping pills, and benzodiazepines) can be dangerous or even fatal.? If I stop taking my medicines suddenly, I may have severe withdrawal symptoms.     The risks,  benefits, and side effects of these medicine(s) were explained to me. I agree that:    I will take part in other treatments as advised by my care team. This may be psychiatry or counseling, physical therapy, behavioral therapy, group treatment or a referral to specialist.    I will keep all my appointments and understand this is part of the monitoring of controlled substances.?My care team may require an office visit for EVERY controlled substance refill. If I miss appointments or don t follow instructions, my care team may stop my medicine    I will take my medicines as prescribed. I will not change the dose or schedule unless my care team tells me to. There will be no refills if I run out early.      I may be asked to come to the clinic and complete a urine drug test or complete a pill count. If I don t give a urine sample or participate in a pill count, the care team may stop my medicine.    I will only receive controlled substance prescriptions from this clinic. If I am treated by another provider, I will tell them that I am taking controlled substances and that I have a treatment agreement with this provider. I will inform my [facility] care team within one business day if I am given a prescription for any controlled substance by another healthcare provider. My [facility] care team can contact other providers and pharmacists about my use of any medicines.    It is up to me to make sure that I don't run out of my medicines on weekends or holidays.?If my care team is willing to refill my prescription without a visit, I must request refills only during office hours. Refills may take up to 3 business days to process. I will use one pharmacy to fill all my controlled substance prescriptions. I will notify the clinic about any changes to my insurance or medicine availability.    I am responsible for my prescriptions. If the medicine/prescription is lost, stolen or destroyed, it will not be replaced.?I  also agree not to share controlled substance medicines with anyone.     I am aware I should not use any illegal or recreational drugs. I agree not to drink alcohol unless my care team says I can.     If I enroll in the [place] Medical Cannabis program, I will tell my care team before my next refill.    I will tell my care team right away if I become pregnant, have a new medical problem treated outside of my regular clinic, or have a change in my medicines.     I understand that this medicine can affect my thinking, judgment and reaction time.? Alcohol and drugs affect the brain and body, which can affect the safety of my driving. Being under the influence of alcohol or drugs can affect my decision-making, behaviors, personal safety and the safety of others. Driving while impaired (DWI) can occur if a person is driving, operating or in physical control of a car, motorcycle, boat, snowmobile, ATV, motorbike, off-road vehicle or any other motor vehicle ([place] Statute 169A.20). I understand the risk if I choose to drive or operate any vehicle or machinery.    I understand that if I do not follow any of the conditions above, my prescriptions or treatment may be stopped or changed.   I agree that my provider, clinic care team and pharmacy may work with any city, state or federal law enforcement agency that investigates the misuse, sale or other diversion of my controlled medicine. I will allow my provider to discuss my care with, or share a copy of, this agreement with any other treating provider, pharmacy or emergency room where I receive care.     I have read this agreement and have asked questions about anything I did not understand.    ________________________________________________________  Patient Signature - Mulugeta Valentin     ___________________                   Date     ________________________________________________________  Provider Signature - Moises Orozco MD       ___________________                    Date     ________________________________________________________  Witness Signature (required if provider not present while patient signing)          ___________________                   Date

## 2024-08-05 NOTE — PROGRESS NOTES
"  Assessment & Plan     Health care maintenance  Patient will do labs a future date when he is fasting  - Comprehensive metabolic panel  - CBC with platelets  - Lipid panel reflex to direct LDL Fasting    Attention deficit disorder (ADD) without hyperactivity  Patient feels medication is helping  Drug screen and CSA filled out today  - Urine Drug Screen  - Urine Drug Screen    Primary hypertension  Blood pressure slightly elevated today  Patient is on lisinopril 10 mg daily he feels that his blood pressures been running normal at home he like to monitor will send me updated blood pressure readings in a week            BMI  Estimated body mass index is 27.92 kg/m  as calculated from the following:    Height as of this encounter: 1.676 m (5' 6\").    Weight as of this encounter: 78.5 kg (173 lb).             Anna Barraza is a 34 year old, presenting for the following health issues:  A.D.H.D (Medication check ) and Hypertension (Medication check, not fasting )  Patient is here for medication follow-up  Currently taking Adderall 30 mg extended release daily for ADD.  Medication has been working well for him.  Updated CSA and drug screen today.  Blood pressure is elevated which he feels is atypical for him he like to monitor over the next week prior to making dosing changes on lisinopril.  He continues with sertraline and is doing well is no longer taking his bupropion.        8/5/2024     1:16 PM   Additional Questions   Roomed by Vanessa CERON CMA     History of Present Illness       Mental Health Follow-up:  Patient presents to follow-up on Anxiety.    Patient's anxiety since last visit has been:  Good  The patient is not having other symptoms associated with anxiety.  Any significant life events: job concerns  Patient is not feeling anxious or having panic attacks.  Patient has no concerns about alcohol or drug use.    Hypertension: He presents for follow up of hypertension.  He does not check blood pressure  regularly " "outside of the clinic. Outpatient blood pressures have not been over 140/90. He does not follow a low salt diet.     Reason for visit:  Medication follow-up, pretty much    He eats 2-3 servings of fruits and vegetables daily.He consumes 1 sweetened beverage(s) daily.He exercises with enough effort to increase his heart rate 20 to 29 minutes per day.  He exercises with enough effort to increase his heart rate 4 days per week.   He is taking medications regularly.                     Objective    BP (!) 133/99 (BP Location: Left arm, Patient Position: Sitting, Cuff Size: Adult Large)   Pulse 89   Temp 98  F (36.7  C) (Oral)   Resp 16   Ht 1.676 m (5' 6\")   Wt 78.5 kg (173 lb)   SpO2 95%   BMI 27.92 kg/m    Body mass index is 27.92 kg/m .  Physical Exam   GENERAL: alert and no distress  EYES: Eyes grossly normal to inspection, PERRL and conjunctivae and sclerae normal  RESP: lungs clear to auscultation - no rales, rhonchi or wheezes  CV: regular rate and rhythm, normal S1 S2, no S3 or S4, no murmur, click or rub, no peripheral edema  MS: no gross musculoskeletal defects noted, no edema  NEURO: Normal strength and tone, mentation intact and speech normal  PSYCH: mentation appears normal, affect normal/bright            Signed Electronically by: Moises Orozco MD    "

## 2024-08-12 DIAGNOSIS — F98.8 ATTENTION DEFICIT DISORDER (ADD) WITHOUT HYPERACTIVITY: ICD-10-CM

## 2024-08-12 DIAGNOSIS — I10 ESSENTIAL HYPERTENSION: ICD-10-CM

## 2024-08-12 RX ORDER — DEXTROAMPHETAMINE SACCHARATE, AMPHETAMINE ASPARTATE MONOHYDRATE, DEXTROAMPHETAMINE SULFATE AND AMPHETAMINE SULFATE 7.5; 7.5; 7.5; 7.5 MG/1; MG/1; MG/1; MG/1
30 CAPSULE, EXTENDED RELEASE ORAL EVERY MORNING
Qty: 30 CAPSULE | Refills: 0 | Status: SHIPPED | OUTPATIENT
Start: 2024-08-12 | End: 2024-09-10

## 2024-08-12 RX ORDER — LISINOPRIL 20 MG/1
20 TABLET ORAL DAILY
Qty: 90 TABLET | Refills: 1 | Status: SHIPPED | OUTPATIENT
Start: 2024-08-12

## 2024-09-10 DIAGNOSIS — F98.8 ATTENTION DEFICIT DISORDER (ADD) WITHOUT HYPERACTIVITY: ICD-10-CM

## 2024-09-10 RX ORDER — DEXTROAMPHETAMINE SACCHARATE, AMPHETAMINE ASPARTATE MONOHYDRATE, DEXTROAMPHETAMINE SULFATE AND AMPHETAMINE SULFATE 7.5; 7.5; 7.5; 7.5 MG/1; MG/1; MG/1; MG/1
30 CAPSULE, EXTENDED RELEASE ORAL EVERY MORNING
Qty: 30 CAPSULE | Refills: 0 | Status: SHIPPED | OUTPATIENT
Start: 2024-09-10

## 2024-10-05 ENCOUNTER — HEALTH MAINTENANCE LETTER (OUTPATIENT)
Age: 34
End: 2024-10-05

## 2024-10-10 ENCOUNTER — MYC REFILL (OUTPATIENT)
Dept: FAMILY MEDICINE | Facility: CLINIC | Age: 34
End: 2024-10-10
Payer: COMMERCIAL

## 2024-10-10 DIAGNOSIS — F98.8 ATTENTION DEFICIT DISORDER (ADD) WITHOUT HYPERACTIVITY: ICD-10-CM

## 2024-10-10 RX ORDER — DEXTROAMPHETAMINE SACCHARATE, AMPHETAMINE ASPARTATE MONOHYDRATE, DEXTROAMPHETAMINE SULFATE AND AMPHETAMINE SULFATE 7.5; 7.5; 7.5; 7.5 MG/1; MG/1; MG/1; MG/1
30 CAPSULE, EXTENDED RELEASE ORAL EVERY MORNING
Qty: 30 CAPSULE | Refills: 0 | Status: SHIPPED | OUTPATIENT
Start: 2024-10-10 | End: 2024-11-08

## 2024-11-08 ENCOUNTER — MYC REFILL (OUTPATIENT)
Dept: FAMILY MEDICINE | Facility: CLINIC | Age: 34
End: 2024-11-08
Payer: COMMERCIAL

## 2024-11-08 DIAGNOSIS — F98.8 ATTENTION DEFICIT DISORDER (ADD) WITHOUT HYPERACTIVITY: ICD-10-CM

## 2024-11-08 RX ORDER — DEXTROAMPHETAMINE SACCHARATE, AMPHETAMINE ASPARTATE MONOHYDRATE, DEXTROAMPHETAMINE SULFATE AND AMPHETAMINE SULFATE 7.5; 7.5; 7.5; 7.5 MG/1; MG/1; MG/1; MG/1
30 CAPSULE, EXTENDED RELEASE ORAL EVERY MORNING
Qty: 30 CAPSULE | Refills: 0 | Status: SHIPPED | OUTPATIENT
Start: 2024-11-08

## 2024-11-25 DIAGNOSIS — F34.1 DYSTHYMIA: ICD-10-CM

## 2025-01-12 ENCOUNTER — MYC REFILL (OUTPATIENT)
Dept: FAMILY MEDICINE | Facility: CLINIC | Age: 35
End: 2025-01-12
Payer: COMMERCIAL

## 2025-01-12 DIAGNOSIS — R41.840 ATTENTION OR CONCENTRATION DEFICIT: ICD-10-CM

## 2025-01-13 DIAGNOSIS — R41.840 ATTENTION OR CONCENTRATION DEFICIT: Primary | ICD-10-CM

## 2025-01-13 RX ORDER — DEXTROAMPHETAMINE SACCHARATE, AMPHETAMINE ASPARTATE, DEXTROAMPHETAMINE SULFATE AND AMPHETAMINE SULFATE 5; 5; 5; 5 MG/1; MG/1; MG/1; MG/1
20 TABLET ORAL 2 TIMES DAILY
Qty: 60 TABLET | Refills: 0 | Status: SHIPPED | OUTPATIENT
Start: 2025-01-13

## 2025-01-13 RX ORDER — DEXTROAMPHETAMINE SACCHARATE, AMPHETAMINE ASPARTATE, DEXTROAMPHETAMINE SULFATE AND AMPHETAMINE SULFATE 3.75; 3.75; 3.75; 3.75 MG/1; MG/1; MG/1; MG/1
15 TABLET ORAL 2 TIMES DAILY
Qty: 60 TABLET | Refills: 0 | OUTPATIENT
Start: 2025-01-13

## 2025-02-12 ENCOUNTER — MYC REFILL (OUTPATIENT)
Dept: FAMILY MEDICINE | Facility: CLINIC | Age: 35
End: 2025-02-12
Payer: COMMERCIAL

## 2025-02-12 DIAGNOSIS — R41.840 ATTENTION OR CONCENTRATION DEFICIT: ICD-10-CM

## 2025-02-13 DIAGNOSIS — I10 ESSENTIAL HYPERTENSION: ICD-10-CM

## 2025-02-13 RX ORDER — DEXTROAMPHETAMINE SACCHARATE, AMPHETAMINE ASPARTATE, DEXTROAMPHETAMINE SULFATE AND AMPHETAMINE SULFATE 5; 5; 5; 5 MG/1; MG/1; MG/1; MG/1
20 TABLET ORAL 2 TIMES DAILY
Qty: 60 TABLET | Refills: 0 | Status: SHIPPED | OUTPATIENT
Start: 2025-02-13

## 2025-02-13 RX ORDER — LISINOPRIL 20 MG/1
20 TABLET ORAL DAILY
Qty: 90 TABLET | Refills: 1 | Status: SHIPPED | OUTPATIENT
Start: 2025-02-13

## 2025-03-15 ENCOUNTER — MYC REFILL (OUTPATIENT)
Dept: FAMILY MEDICINE | Facility: CLINIC | Age: 35
End: 2025-03-15
Payer: COMMERCIAL

## 2025-03-15 DIAGNOSIS — R41.840 ATTENTION OR CONCENTRATION DEFICIT: ICD-10-CM

## 2025-03-17 RX ORDER — DEXTROAMPHETAMINE SACCHARATE, AMPHETAMINE ASPARTATE, DEXTROAMPHETAMINE SULFATE AND AMPHETAMINE SULFATE 5; 5; 5; 5 MG/1; MG/1; MG/1; MG/1
20 TABLET ORAL 2 TIMES DAILY
Qty: 60 TABLET | Refills: 0 | Status: SHIPPED | OUTPATIENT
Start: 2025-03-17

## 2025-04-15 ENCOUNTER — MYC REFILL (OUTPATIENT)
Dept: FAMILY MEDICINE | Facility: CLINIC | Age: 35
End: 2025-04-15
Payer: COMMERCIAL

## 2025-04-15 DIAGNOSIS — R41.840 ATTENTION OR CONCENTRATION DEFICIT: ICD-10-CM

## 2025-04-15 RX ORDER — DEXTROAMPHETAMINE SACCHARATE, AMPHETAMINE ASPARTATE, DEXTROAMPHETAMINE SULFATE AND AMPHETAMINE SULFATE 5; 5; 5; 5 MG/1; MG/1; MG/1; MG/1
20 TABLET ORAL 2 TIMES DAILY
Qty: 60 TABLET | Refills: 0 | Status: SHIPPED | OUTPATIENT
Start: 2025-04-15

## 2025-05-15 ENCOUNTER — MYC REFILL (OUTPATIENT)
Dept: FAMILY MEDICINE | Facility: CLINIC | Age: 35
End: 2025-05-15
Payer: COMMERCIAL

## 2025-05-15 DIAGNOSIS — R41.840 ATTENTION OR CONCENTRATION DEFICIT: ICD-10-CM

## 2025-05-15 RX ORDER — DEXTROAMPHETAMINE SACCHARATE, AMPHETAMINE ASPARTATE, DEXTROAMPHETAMINE SULFATE AND AMPHETAMINE SULFATE 5; 5; 5; 5 MG/1; MG/1; MG/1; MG/1
20 TABLET ORAL 2 TIMES DAILY
Qty: 60 TABLET | Refills: 0 | Status: SHIPPED | OUTPATIENT
Start: 2025-05-15

## 2025-05-20 DIAGNOSIS — F34.1 DYSTHYMIA: ICD-10-CM

## 2025-06-16 ENCOUNTER — MYC REFILL (OUTPATIENT)
Dept: FAMILY MEDICINE | Facility: CLINIC | Age: 35
End: 2025-06-16
Payer: COMMERCIAL

## 2025-06-16 DIAGNOSIS — R41.840 ATTENTION OR CONCENTRATION DEFICIT: ICD-10-CM

## 2025-06-17 RX ORDER — DEXTROAMPHETAMINE SACCHARATE, AMPHETAMINE ASPARTATE, DEXTROAMPHETAMINE SULFATE AND AMPHETAMINE SULFATE 5; 5; 5; 5 MG/1; MG/1; MG/1; MG/1
20 TABLET ORAL 2 TIMES DAILY
Qty: 60 TABLET | Refills: 0 | Status: SHIPPED | OUTPATIENT
Start: 2025-06-17

## 2025-07-16 ENCOUNTER — MYC REFILL (OUTPATIENT)
Dept: FAMILY MEDICINE | Facility: CLINIC | Age: 35
End: 2025-07-16
Payer: COMMERCIAL

## 2025-07-16 DIAGNOSIS — R41.840 ATTENTION OR CONCENTRATION DEFICIT: ICD-10-CM

## 2025-07-17 RX ORDER — DEXTROAMPHETAMINE SACCHARATE, AMPHETAMINE ASPARTATE, DEXTROAMPHETAMINE SULFATE AND AMPHETAMINE SULFATE 5; 5; 5; 5 MG/1; MG/1; MG/1; MG/1
20 TABLET ORAL 2 TIMES DAILY
Qty: 60 TABLET | Refills: 0 | Status: SHIPPED | OUTPATIENT
Start: 2025-07-17

## 2025-08-17 ENCOUNTER — MYC REFILL (OUTPATIENT)
Dept: FAMILY MEDICINE | Facility: CLINIC | Age: 35
End: 2025-08-17
Payer: COMMERCIAL

## 2025-08-17 DIAGNOSIS — R41.840 ATTENTION OR CONCENTRATION DEFICIT: ICD-10-CM

## 2025-08-18 RX ORDER — DEXTROAMPHETAMINE SACCHARATE, AMPHETAMINE ASPARTATE, DEXTROAMPHETAMINE SULFATE AND AMPHETAMINE SULFATE 5; 5; 5; 5 MG/1; MG/1; MG/1; MG/1
20 TABLET ORAL 2 TIMES DAILY
Qty: 60 TABLET | Refills: 0 | Status: SHIPPED | OUTPATIENT
Start: 2025-08-18

## 2025-08-27 DIAGNOSIS — I10 ESSENTIAL HYPERTENSION: ICD-10-CM

## 2025-08-28 RX ORDER — LISINOPRIL 20 MG/1
20 TABLET ORAL DAILY
Qty: 90 TABLET | Refills: 1 | Status: SHIPPED | OUTPATIENT
Start: 2025-08-28

## (undated) DEVICE — STPL ENDO ARTICULATING 45MM EC45A

## (undated) DEVICE — SU VICRYL 0 UR-6 27" J603H

## (undated) DEVICE — ENDO TROCAR FIRST ENTRY KII FIOS Z-THRD 05X100MM CTF03

## (undated) DEVICE — SUCTION CANISTER MEDIVAC LINER 3000ML W/LID 65651-530

## (undated) DEVICE — PREP CHLORAPREP 26ML TINTED ORANGE  260815

## (undated) DEVICE — SYSTEM CLEARIFY VISUALIZATION 21-345

## (undated) DEVICE — LINEN TOWEL PACK X5 5464

## (undated) DEVICE — ESU GROUND PAD UNIVERSAL W/O CORD

## (undated) DEVICE — ENDO POUCH UNIV RETRIEVAL SYSTEM INZII 10MM CD001

## (undated) DEVICE — ENDO SCOPE WARMER LF TM500

## (undated) DEVICE — ESU HOLDER LAP INST DISP PURPLE LONG 330MM H-PRO-330

## (undated) DEVICE — SOL NACL 0.9% INJ 1000ML BAG 2B1324X

## (undated) DEVICE — SU MONOCRYL 4-0 PS-2 18" UND Y496G

## (undated) DEVICE — SUCTION IRR STRYKERFLOW II W/TIP 250-070-520

## (undated) DEVICE — NDL INSUFFLATION 13GA 120MM C2201

## (undated) DEVICE — GLOVE PROTEXIS W/NEU-THERA 7.5  2D73TE75

## (undated) DEVICE — STPL RELOAD REG TISSUE ECHELON 45 X 3.6MM BLUE GST45B

## (undated) DEVICE — PACK LAP CHOLE SLC15LCFSD

## (undated) DEVICE — GLOVE GAMMEX DERMAPRENE ULTRA SZ 8 LF 8516

## (undated) DEVICE — ENDO TROCAR SLEEVE KII Z-THREADED 05X100MM CTS02

## (undated) DEVICE — ENDO TROCAR FIRST ENTRY KII FIOS Z-THRD 12X100MM CTF73

## (undated) DEVICE — STPL ENDO RELOAD ECHELON 45X2.0MM GREY ECR45M

## (undated) DEVICE — SOL WATER IRRIG 1000ML BOTTLE 2F7114

## (undated) RX ORDER — DEXAMETHASONE SODIUM PHOSPHATE 4 MG/ML
INJECTION, SOLUTION INTRA-ARTICULAR; INTRALESIONAL; INTRAMUSCULAR; INTRAVENOUS; SOFT TISSUE
Status: DISPENSED
Start: 2021-05-07

## (undated) RX ORDER — NEOSTIGMINE METHYLSULFATE 1 MG/ML
VIAL (ML) INJECTION
Status: DISPENSED
Start: 2021-05-07

## (undated) RX ORDER — ONDANSETRON 2 MG/ML
INJECTION INTRAMUSCULAR; INTRAVENOUS
Status: DISPENSED
Start: 2021-05-07

## (undated) RX ORDER — FENTANYL CITRATE 50 UG/ML
INJECTION, SOLUTION INTRAMUSCULAR; INTRAVENOUS
Status: DISPENSED
Start: 2021-05-07

## (undated) RX ORDER — BUPIVACAINE HYDROCHLORIDE AND EPINEPHRINE 5; 5 MG/ML; UG/ML
INJECTION, SOLUTION EPIDURAL; INTRACAUDAL; PERINEURAL
Status: DISPENSED
Start: 2021-05-07

## (undated) RX ORDER — GLYCOPYRROLATE 0.2 MG/ML
INJECTION, SOLUTION INTRAMUSCULAR; INTRAVENOUS
Status: DISPENSED
Start: 2021-05-07

## (undated) RX ORDER — PROPOFOL 10 MG/ML
INJECTION, EMULSION INTRAVENOUS
Status: DISPENSED
Start: 2021-05-07

## (undated) RX ORDER — CEFOTETAN DISODIUM 2 G/20ML
INJECTION, POWDER, FOR SOLUTION INTRAMUSCULAR; INTRAVENOUS
Status: DISPENSED
Start: 2021-05-07